# Patient Record
Sex: MALE | Race: WHITE | NOT HISPANIC OR LATINO | Employment: OTHER | URBAN - METROPOLITAN AREA
[De-identification: names, ages, dates, MRNs, and addresses within clinical notes are randomized per-mention and may not be internally consistent; named-entity substitution may affect disease eponyms.]

---

## 2017-01-19 DIAGNOSIS — E78.5 HYPERLIPIDEMIA: ICD-10-CM

## 2017-01-19 RX ORDER — ATORVASTATIN CALCIUM 80 MG/1
TABLET, FILM COATED ORAL
Qty: 30 TABLET | Refills: 0 | Status: SHIPPED | OUTPATIENT
Start: 2017-01-19 | End: 2017-03-04 | Stop reason: SDUPTHER

## 2017-02-13 DIAGNOSIS — G47.00 INSOMNIA, UNSPECIFIED TYPE: ICD-10-CM

## 2017-02-14 RX ORDER — ALPRAZOLAM 0.25 MG/1
0.25 TABLET ORAL NIGHTLY PRN
Qty: 30 TABLET | Refills: 0 | Status: SHIPPED | OUTPATIENT
Start: 2017-02-14 | End: 2017-07-17 | Stop reason: SDUPTHER

## 2017-03-04 DIAGNOSIS — E78.5 HYPERLIPIDEMIA: ICD-10-CM

## 2017-03-06 RX ORDER — ATORVASTATIN CALCIUM 80 MG/1
TABLET, FILM COATED ORAL
Qty: 30 TABLET | Refills: 3 | Status: SHIPPED | OUTPATIENT
Start: 2017-03-06 | End: 2017-07-23 | Stop reason: SDUPTHER

## 2017-07-12 ENCOUNTER — OFFICE VISIT (OUTPATIENT)
Dept: INTERNAL MEDICINE | Facility: CLINIC | Age: 62
End: 2017-07-12
Payer: MEDICARE

## 2017-07-12 DIAGNOSIS — I10 ESSENTIAL HYPERTENSION: Primary | ICD-10-CM

## 2017-07-17 ENCOUNTER — OFFICE VISIT (OUTPATIENT)
Dept: INTERNAL MEDICINE | Facility: CLINIC | Age: 62
End: 2017-07-17
Payer: MEDICARE

## 2017-07-17 VITALS
OXYGEN SATURATION: 96 % | RESPIRATION RATE: 16 BRPM | DIASTOLIC BLOOD PRESSURE: 62 MMHG | BODY MASS INDEX: 29.99 KG/M2 | SYSTOLIC BLOOD PRESSURE: 112 MMHG | HEART RATE: 81 BPM | HEIGHT: 76 IN | WEIGHT: 246.25 LBS

## 2017-07-17 DIAGNOSIS — F41.1 GENERALIZED ANXIETY DISORDER: Primary | ICD-10-CM

## 2017-07-17 DIAGNOSIS — F41.0 PANIC DISORDER: ICD-10-CM

## 2017-07-17 DIAGNOSIS — G47.00 INSOMNIA, UNSPECIFIED TYPE: ICD-10-CM

## 2017-07-17 PROCEDURE — 99213 OFFICE O/P EST LOW 20 MIN: CPT | Mod: PBBFAC | Performed by: INTERNAL MEDICINE

## 2017-07-17 PROCEDURE — 99214 OFFICE O/P EST MOD 30 MIN: CPT | Mod: S$PBB | Performed by: INTERNAL MEDICINE

## 2017-07-17 PROCEDURE — 99999 PR PBB SHADOW E&M-EST. PATIENT-LVL III: CPT | Mod: PBBFAC,,, | Performed by: INTERNAL MEDICINE

## 2017-07-17 RX ORDER — METOPROLOL SUCCINATE 50 MG/1
50 TABLET, EXTENDED RELEASE ORAL DAILY
Status: ON HOLD | COMMUNITY
Start: 2017-05-29 | End: 2018-01-28

## 2017-07-17 RX ORDER — ALPRAZOLAM 0.25 MG/1
0.25 TABLET ORAL NIGHTLY PRN
Qty: 30 TABLET | Refills: 0 | Status: SHIPPED | OUTPATIENT
Start: 2017-07-17 | End: 2017-08-17 | Stop reason: SDUPTHER

## 2017-07-17 RX ORDER — AMLODIPINE BESYLATE 10 MG/1
10 TABLET ORAL DAILY
Status: ON HOLD | COMMUNITY
Start: 2017-06-25 | End: 2021-03-23 | Stop reason: SDUPTHER

## 2017-07-17 RX ORDER — SERTRALINE HYDROCHLORIDE 50 MG/1
50 TABLET, FILM COATED ORAL DAILY
Qty: 30 TABLET | Refills: 3 | Status: ON HOLD | OUTPATIENT
Start: 2017-07-17 | End: 2018-01-26

## 2017-07-17 NOTE — PROGRESS NOTES
Subjective:       Patient ID: Juan Young is a 61 y.o. male.    Chief Complaint: Dizziness    Juan Young is a 61 y.o. male  Here for multiple symptoms;  C/o dizziness for 4 weeks; very nervous ;excessive worry .  Sad at times. Crying ;looks depressed .  Fluttering in chest ;sees  Dr Fail .  Not sleeping good.        Dizziness:    Associated symptoms: headaches and light-headedness.no fever, no tinnitus, no vomiting and no chest pain.    Review of Systems   Constitutional: Negative for chills and fever.   HENT: Negative for congestion, postnasal drip and tinnitus.    Respiratory: Negative for chest tightness and shortness of breath.    Cardiovascular: Negative for chest pain.   Gastrointestinal: Negative for abdominal distention, blood in stool and vomiting.   Genitourinary: Negative for dysuria, flank pain and hematuria.   Musculoskeletal: Negative for myalgias.   Skin: Negative for pallor.   Neurological: Positive for dizziness, light-headedness and headaches. Negative for facial asymmetry and speech difficulty.   Hematological: Does not bruise/bleed easily.   Psychiatric/Behavioral: Positive for decreased concentration. Negative for agitation and suicidal ideas. The patient is nervous/anxious.        Objective:      Physical Exam   Constitutional: He is oriented to person, place, and time. He appears well-developed and well-nourished.   HENT:   Head: Normocephalic and atraumatic.   Neck: No JVD present.   Cardiovascular: Normal rate, regular rhythm and normal heart sounds.    Pulmonary/Chest: Effort normal and breath sounds normal.   Abdominal: Soft. Bowel sounds are normal. There is no tenderness.   Musculoskeletal: He exhibits no edema.   Neurological: He is alert and oriented to person, place, and time.   Skin: Skin is warm and dry.   Psychiatric: He has a normal mood and affect. His behavior is normal. Judgment and thought content normal.   Nursing note and vitals reviewed.      Assessment:       1.  Generalized anxiety disorder    2. Panic disorder    3. Insomnia, unspecified type        Plan:   Juan was seen today for dizziness.    Diagnoses and all orders for this visit:    Generalized anxiety disorder  -     sertraline (ZOLOFT) 50 MG tablet; Take 1 tablet (50 mg total) by mouth once daily.    Panic disorder  -     sertraline (ZOLOFT) 50 MG tablet; Take 1 tablet (50 mg total) by mouth once daily.    Insomnia, unspecified type  -     alprazolam (XANAX) 0.25 MG tablet; Take 1 tablet (0.25 mg total) by mouth nightly as needed for Anxiety.    his dizziness ; anxious feeling all could be   Excessive anxiety and panic attacks.      RTC 4 weeks.

## 2017-07-23 DIAGNOSIS — E78.5 HYPERLIPIDEMIA: ICD-10-CM

## 2017-07-24 RX ORDER — ATORVASTATIN CALCIUM 80 MG/1
TABLET, FILM COATED ORAL
Qty: 30 TABLET | Refills: 5 | Status: SHIPPED | OUTPATIENT
Start: 2017-07-24 | End: 2017-08-17

## 2017-08-17 ENCOUNTER — OFFICE VISIT (OUTPATIENT)
Dept: INTERNAL MEDICINE | Facility: CLINIC | Age: 62
End: 2017-08-17
Payer: MEDICARE

## 2017-08-17 VITALS
DIASTOLIC BLOOD PRESSURE: 74 MMHG | HEART RATE: 64 BPM | WEIGHT: 250 LBS | HEIGHT: 76 IN | BODY MASS INDEX: 30.44 KG/M2 | RESPIRATION RATE: 16 BRPM | SYSTOLIC BLOOD PRESSURE: 120 MMHG

## 2017-08-17 DIAGNOSIS — F41.1 GAD (GENERALIZED ANXIETY DISORDER): Primary | ICD-10-CM

## 2017-08-17 DIAGNOSIS — G47.00 INSOMNIA, UNSPECIFIED TYPE: ICD-10-CM

## 2017-08-17 PROCEDURE — 99213 OFFICE O/P EST LOW 20 MIN: CPT | Mod: S$PBB | Performed by: INTERNAL MEDICINE

## 2017-08-17 PROCEDURE — 99999 PR STA SHADOW: CPT | Mod: PBBFAC,,, | Performed by: INTERNAL MEDICINE

## 2017-08-17 PROCEDURE — 99999 PR PBB SHADOW E&M-EST. PATIENT-LVL III: CPT | Mod: PBBFAC,,, | Performed by: INTERNAL MEDICINE

## 2017-08-17 PROCEDURE — 99213 OFFICE O/P EST LOW 20 MIN: CPT | Mod: PBBFAC | Performed by: INTERNAL MEDICINE

## 2017-08-17 RX ORDER — ALPRAZOLAM 0.25 MG/1
0.25 TABLET ORAL NIGHTLY PRN
Qty: 30 TABLET | Refills: 0 | Status: SHIPPED | OUTPATIENT
Start: 2017-08-17 | End: 2017-11-08 | Stop reason: SDUPTHER

## 2017-08-17 RX ORDER — ATORVASTATIN CALCIUM 40 MG/1
40 TABLET, FILM COATED ORAL DAILY
Status: ON HOLD | COMMUNITY
End: 2018-01-28

## 2017-08-17 NOTE — PROGRESS NOTES
Subjective:       Patient ID: Juan Young is a 61 y.o. male.    Chief Complaint: Follow-up (4 wk; anxiety )    Juan Young is a 61 y.o. male  Here for follow up for anxiety and insomnia.  I started him on zoloft and xanax .  He stopped zoloft  Because he felt too drowsy.  He reports taking alprazolam as needed .  We discussed that he should take zoloft again ;he is willing to try that.  He reports anxiety and depressive symptoms are better; some dizzy spells at times.  He has been taking 20 mg of lisinopril and also 1/2 lipitor too        Review of Systems   Constitutional: Negative for chills and fever.   HENT: Negative for congestion, postnasal drip and tinnitus.    Respiratory: Negative for chest tightness and shortness of breath.    Cardiovascular: Negative for chest pain.   Gastrointestinal: Negative for abdominal distention, blood in stool and vomiting.   Genitourinary: Negative for dysuria, flank pain and hematuria.   Musculoskeletal: Negative for myalgias.   Skin: Negative for pallor.   Neurological: Positive for dizziness, light-headedness and headaches. Negative for facial asymmetry and speech difficulty.   Hematological: Does not bruise/bleed easily.   Psychiatric/Behavioral: Positive for decreased concentration. Negative for agitation and suicidal ideas. The patient is nervous/anxious.        Objective:      Physical Exam   Constitutional: He is oriented to person, place, and time. He appears well-developed and well-nourished.   HENT:   Head: Normocephalic and atraumatic.   Neck: No JVD present.   Cardiovascular: Normal rate, regular rhythm and normal heart sounds.    Pulmonary/Chest: Effort normal and breath sounds normal.   Abdominal: Soft. Bowel sounds are normal. There is no tenderness.   Musculoskeletal: He exhibits no edema.   Neurological: He is alert and oriented to person, place, and time.   Skin: Skin is warm and dry.   Psychiatric: He has a normal mood and affect. His behavior is normal.  Judgment and thought content normal.   Nursing note and vitals reviewed.      Assessment:       1. ANDREW (generalized anxiety disorder)    2. Insomnia, unspecified type        Plan:   Juan was seen today for follow-up.    Diagnoses and all orders for this visit:    ANDREW (generalized anxiety disorder)  Resume zoloft   Prn alprazolam  Avoid driving ; watch for over sedation     Insomnia, unspecified type  -     alprazolam (XANAX) 0.25 MG tablet; Take 1 tablet (0.25 mg total) by mouth nightly as needed for Anxiety.    I told him to do labs in 3-6 months; he will do them with cardiologist .

## 2017-09-15 DIAGNOSIS — Z12.11 COLON CANCER SCREENING: ICD-10-CM

## 2017-11-08 ENCOUNTER — OFFICE VISIT (OUTPATIENT)
Dept: INTERNAL MEDICINE | Facility: CLINIC | Age: 62
End: 2017-11-08
Payer: MEDICARE

## 2017-11-08 VITALS
HEIGHT: 76 IN | RESPIRATION RATE: 14 BRPM | HEART RATE: 65 BPM | SYSTOLIC BLOOD PRESSURE: 110 MMHG | BODY MASS INDEX: 29.88 KG/M2 | OXYGEN SATURATION: 96 % | WEIGHT: 245.38 LBS | DIASTOLIC BLOOD PRESSURE: 80 MMHG

## 2017-11-08 DIAGNOSIS — F41.1 GAD (GENERALIZED ANXIETY DISORDER): Primary | ICD-10-CM

## 2017-11-08 DIAGNOSIS — I24.9 ACS (ACUTE CORONARY SYNDROME): ICD-10-CM

## 2017-11-08 DIAGNOSIS — Z12.5 SCREENING FOR PROSTATE CANCER: ICD-10-CM

## 2017-11-08 DIAGNOSIS — I10 ESSENTIAL HYPERTENSION: ICD-10-CM

## 2017-11-08 DIAGNOSIS — E78.5 HYPERLIPIDEMIA, UNSPECIFIED HYPERLIPIDEMIA TYPE: Chronic | ICD-10-CM

## 2017-11-08 DIAGNOSIS — G47.00 INSOMNIA, UNSPECIFIED TYPE: ICD-10-CM

## 2017-11-08 PROCEDURE — 99999 PR STA SHADOW: CPT | Mod: PBBFAC,,, | Performed by: INTERNAL MEDICINE

## 2017-11-08 PROCEDURE — 90688 IIV4 VACCINE SPLT 0.5 ML IM: CPT | Mod: PBBFAC

## 2017-11-08 PROCEDURE — 99999 FLU VACCINE (QUAD) DO NOT ORDER: CPT | Mod: PBBFAC,,,

## 2017-11-08 PROCEDURE — 99999 PR PBB SHADOW E&M-EST. PATIENT-LVL III: CPT | Mod: PBBFAC,,, | Performed by: INTERNAL MEDICINE

## 2017-11-08 PROCEDURE — 99213 OFFICE O/P EST LOW 20 MIN: CPT | Mod: S$PBB | Performed by: INTERNAL MEDICINE

## 2017-11-08 PROCEDURE — 99213 OFFICE O/P EST LOW 20 MIN: CPT | Mod: PBBFAC | Performed by: INTERNAL MEDICINE

## 2017-11-08 RX ORDER — ALPRAZOLAM 0.25 MG/1
0.25 TABLET ORAL NIGHTLY PRN
Qty: 30 TABLET | Refills: 0 | Status: SHIPPED | OUTPATIENT
Start: 2017-11-08 | End: 2018-02-14 | Stop reason: SDUPTHER

## 2017-11-08 NOTE — PROGRESS NOTES
Subjective:       Patient ID: Juan Young is a 62 y.o. male.    Chief Complaint: Anxiety (3 month follow up) and Insomnia    Juan Young is a 61 y.o. male  Here for follow up for anxiety and insomnia.  I started him on zoloft and xanax .  He stopped zoloft  Because he felt too drowsy.  He reports taking alprazolam as needed .  We discussed that he should take zoloft again ;he is willing to try that.  He reports anxiety and depressive symptoms are better; some dizzy spells at times.  He has been taking 20 mg of lisinopril and also 1/2 lipitor too        Anxiety   Symptoms include decreased concentration, dizziness and nervous/anxious behavior. Patient reports no chest pain, shortness of breath or suicidal ideas.         Review of Systems   Constitutional: Negative for chills and fever.   HENT: Negative for congestion, postnasal drip and tinnitus.    Respiratory: Negative for chest tightness and shortness of breath.    Cardiovascular: Negative for chest pain.   Gastrointestinal: Negative for abdominal distention, blood in stool and vomiting.   Genitourinary: Negative for dysuria, flank pain and hematuria.   Musculoskeletal: Negative for myalgias.   Skin: Negative for pallor.   Neurological: Positive for dizziness, light-headedness and headaches. Negative for facial asymmetry and speech difficulty.   Hematological: Does not bruise/bleed easily.   Psychiatric/Behavioral: Positive for decreased concentration. Negative for agitation and suicidal ideas. The patient is nervous/anxious.        Objective:      Physical Exam   Constitutional: He is oriented to person, place, and time. He appears well-developed and well-nourished.   HENT:   Head: Normocephalic and atraumatic.   Neck: No JVD present.   Cardiovascular: Normal rate, regular rhythm and normal heart sounds.    Pulmonary/Chest: Effort normal and breath sounds normal.   Abdominal: Soft. Bowel sounds are normal. There is no tenderness.   Musculoskeletal: He  exhibits no edema.   Neurological: He is alert and oriented to person, place, and time.   Skin: Skin is warm and dry.   Psychiatric: He has a normal mood and affect. His behavior is normal. Judgment and thought content normal.   Nursing note and vitals reviewed.      Assessment:       1. ANDREW (generalized anxiety disorder)    2. Insomnia, unspecified type    3. Essential hypertension    4. Hyperlipidemia, unspecified hyperlipidemia type    5. ACS (acute coronary syndrome)    6. Screening for prostate cancer        Plan:   Juan was seen today for anxiety and insomnia.    Diagnoses and all orders for this visit:    ANDREW (generalized anxiety disorder)  -     ALPRAZolam (XANAX) 0.25 MG tablet; Take 1 tablet (0.25 mg total) by mouth nightly as needed for Anxiety.    Insomnia, unspecified type  -     ALPRAZolam (XANAX) 0.25 MG tablet; Take 1 tablet (0.25 mg total) by mouth nightly as needed for Anxiety.    Essential hypertension  -     CBC auto differential; Future  -     Comprehensive metabolic panel; Future    Hyperlipidemia, unspecified hyperlipidemia type  -     Lipid panel; Future  -     TSH; Future    ACS (acute coronary syndrome)  -     Lipid panel; Future    Screening for prostate cancer  -     PSA, Screening; Future

## 2017-12-21 ENCOUNTER — TELEPHONE (OUTPATIENT)
Dept: INTERNAL MEDICINE | Facility: CLINIC | Age: 62
End: 2017-12-21

## 2017-12-21 RX ORDER — BENZONATATE 200 MG/1
200 CAPSULE ORAL 3 TIMES DAILY PRN
Qty: 30 CAPSULE | Refills: 0 | Status: SHIPPED | OUTPATIENT
Start: 2017-12-21 | End: 2017-12-31

## 2017-12-21 RX ORDER — OSELTAMIVIR PHOSPHATE 75 MG/1
75 CAPSULE ORAL DAILY
Qty: 10 CAPSULE | Refills: 0 | Status: SHIPPED | OUTPATIENT
Start: 2017-12-21 | End: 2017-12-31

## 2017-12-21 NOTE — TELEPHONE ENCOUNTER
pts grandson has diagnosed flu. He was coughing in pts face 2 nights ago, and pt started with severe headache and coughing last night. Can we send him in some Tamiflu and something for cough to [facility]?

## 2017-12-21 NOTE — TELEPHONE ENCOUNTER
----- Message from Joanne Venegas sent at 2017  9:29 AM CST -----  Contact: SELF  Juan Young  MRN: [id number]  : 1955  PCP: Soren Pineda  Home Phone      [phone]  Work Phone      Not on file.  Mobile          [phone]      MESSAGE: WANTS TO BE SEEN TODAY FOR POSSIBLE FLU. PLEASE CALL.     PHONE: [phone]

## 2018-01-26 PROBLEM — Z98.61 CAD S/P PERCUTANEOUS CORONARY ANGIOPLASTY: Chronic | Status: ACTIVE | Noted: 2018-01-26

## 2018-01-26 PROBLEM — R07.9 CHEST PAIN: Status: ACTIVE | Noted: 2018-01-26

## 2018-01-26 PROBLEM — I25.10 CAD S/P PERCUTANEOUS CORONARY ANGIOPLASTY: Chronic | Status: ACTIVE | Noted: 2018-01-26

## 2018-01-26 PROBLEM — I20.9 ANGINA, CLASS III: Status: ACTIVE | Noted: 2018-01-26

## 2018-01-30 ENCOUNTER — TELEPHONE (OUTPATIENT)
Dept: INTERNAL MEDICINE | Facility: CLINIC | Age: 63
End: 2018-01-30

## 2018-01-30 NOTE — TELEPHONE ENCOUNTER
----- Message from Joanne Venegas sent at 2018  2:07 PM CST -----  Contact: SELF   Juan Young  MRN: [id number]  : 1955  PCP: Soren Pineda  Home Phone      [phone]  Work Phone      Not on file.  Mobile          [phone]      MESSAGE: PT WANTS TO KNOW IF HE STILL HAS TO HAVE ROUTINE BW DONE NEXT WEEK. HE SAID HE JUST HAD A HEART ATTACK. PLEASE CALL    PHONE: [phone]

## 2018-02-14 ENCOUNTER — OFFICE VISIT (OUTPATIENT)
Dept: INTERNAL MEDICINE | Facility: CLINIC | Age: 63
End: 2018-02-14
Payer: MEDICARE

## 2018-02-14 VITALS
HEIGHT: 76 IN | OXYGEN SATURATION: 97 % | SYSTOLIC BLOOD PRESSURE: 120 MMHG | RESPIRATION RATE: 16 BRPM | WEIGHT: 237.88 LBS | HEART RATE: 98 BPM | BODY MASS INDEX: 28.97 KG/M2 | DIASTOLIC BLOOD PRESSURE: 72 MMHG | TEMPERATURE: 99 F

## 2018-02-14 DIAGNOSIS — J06.9 VIRAL URI WITH COUGH: Primary | ICD-10-CM

## 2018-02-14 DIAGNOSIS — F41.1 GAD (GENERALIZED ANXIETY DISORDER): ICD-10-CM

## 2018-02-14 DIAGNOSIS — G47.00 INSOMNIA, UNSPECIFIED TYPE: ICD-10-CM

## 2018-02-14 PROCEDURE — 99213 OFFICE O/P EST LOW 20 MIN: CPT | Mod: PBBFAC,25 | Performed by: INTERNAL MEDICINE

## 2018-02-14 PROCEDURE — 96372 THER/PROPH/DIAG INJ SC/IM: CPT | Mod: PBBFAC

## 2018-02-14 PROCEDURE — 99999 PR PBB SHADOW E&M-EST. PATIENT-LVL III: CPT | Mod: PBBFAC,,, | Performed by: INTERNAL MEDICINE

## 2018-02-14 PROCEDURE — 99999 PR STA SHADOW: CPT | Mod: PBBFAC,,, | Performed by: INTERNAL MEDICINE

## 2018-02-14 PROCEDURE — 99999 PR STA SHADOW: CPT | Mod: PBBFAC,,,

## 2018-02-14 PROCEDURE — 1111F DSCHRG MED/CURRENT MED MERGE: CPT | Performed by: INTERNAL MEDICINE

## 2018-02-14 PROCEDURE — 99214 OFFICE O/P EST MOD 30 MIN: CPT | Mod: S$PBB | Performed by: INTERNAL MEDICINE

## 2018-02-14 RX ORDER — CODEINE PHOSPHATE AND GUAIFENESIN 10; 100 MG/5ML; MG/5ML
5 SOLUTION ORAL EVERY 6 HOURS PRN
Qty: 120 ML | Refills: 0 | Status: SHIPPED | OUTPATIENT
Start: 2018-02-14 | End: 2018-04-14

## 2018-02-14 RX ORDER — ALPRAZOLAM 0.25 MG/1
0.25 TABLET ORAL NIGHTLY PRN
Qty: 30 TABLET | Refills: 0 | Status: SHIPPED | OUTPATIENT
Start: 2018-02-14 | End: 2018-08-14 | Stop reason: SDUPTHER

## 2018-02-14 RX ORDER — METHYLPREDNISOLONE ACETATE 80 MG/ML
80 INJECTION, SUSPENSION INTRA-ARTICULAR; INTRALESIONAL; INTRAMUSCULAR; SOFT TISSUE
Status: COMPLETED | OUTPATIENT
Start: 2018-02-14 | End: 2018-02-14

## 2018-02-14 RX ADMIN — METHYLPREDNISOLONE ACETATE 80 MG: 80 INJECTION, SUSPENSION INTRA-ARTICULAR; INTRALESIONAL; INTRAMUSCULAR; SOFT TISSUE at 10:02

## 2018-02-14 NOTE — PROGRESS NOTES
Subjective:       Patient ID: Juan Young is a 62 y.o. male.    Chief Complaint: Anxiety (3 month follow up); Insomnia; Cough; and Sore Throat    Juan Young is a 62 y.o. male  Here for anxiety and URI       Anxiety   Presents for follow-up visit. Symptoms include excessive worry, muscle tension, nervous/anxious behavior and palpitations. Patient reports no chest pain, dizziness, nausea or shortness of breath. The quality of sleep is poor.       Cough   This is a new problem. The current episode started in the past 7 days. The problem has been gradually worsening. The problem occurs hourly. The cough is non-productive. Associated symptoms include postnasal drip and a sore throat. Pertinent negatives include no chest pain, headaches, rash or shortness of breath. Treatments tried: halls. The treatment provided no relief.   Sore Throat    Associated symptoms include congestion and coughing. Pertinent negatives include no abdominal pain, diarrhea, headaches, shortness of breath or vomiting.     Review of Systems   Constitutional: Positive for fatigue.   HENT: Positive for congestion, postnasal drip, sinus pressure and sore throat.    Eyes: Negative.    Respiratory: Positive for cough. Negative for chest tightness and shortness of breath.    Cardiovascular: Positive for palpitations. Negative for chest pain and leg swelling.   Gastrointestinal: Negative for abdominal distention, abdominal pain, constipation, diarrhea, nausea and vomiting.   Genitourinary: Negative for dysuria, flank pain, frequency and urgency.   Musculoskeletal: Negative for back pain and joint swelling.   Skin: Negative for rash and wound.   Neurological: Negative for dizziness, seizures, speech difficulty, weakness, light-headedness and headaches.   Psychiatric/Behavioral: The patient is nervous/anxious.        Objective:      Physical Exam   Constitutional: He is oriented to person, place, and time. He appears well-developed and well-nourished.    HENT:   Head: Normocephalic and atraumatic.   Right Ear: Tympanic membrane is retracted.   Left Ear: Tympanic membrane is retracted.   Mouth/Throat: No oropharyngeal exudate.   Nasal congestion  Mild pharyngeal erythema; no exudate  Bilateral TM retracted with fluid noted   Eyes: Pupils are equal, round, and reactive to light.   Neck: Normal range of motion. Neck supple.   Cardiovascular: Normal rate, regular rhythm, normal heart sounds and intact distal pulses.    Pulmonary/Chest: Effort normal and breath sounds normal.   Abdominal: Soft. Bowel sounds are normal. He exhibits no mass. There is no tenderness.   Musculoskeletal: Normal range of motion. He exhibits no edema.   Neurological: He is alert and oriented to person, place, and time. He has normal reflexes.   Skin: Skin is warm and dry.   Psychiatric: He has a normal mood and affect. His behavior is normal. Judgment and thought content normal.       Assessment:       1. Viral URI with cough    2. Insomnia, unspecified type    3. ANDREW (generalized anxiety disorder)        Plan:   Juan was seen today for anxiety, insomnia, cough and sore throat.    Diagnoses and all orders for this visit:    Viral URI with cough  -     guaifenesin-codeine 100-10 mg/5 ml (CHERATUSSIN AC)  mg/5 mL syrup; Take 5 mLs by mouth every 6 (six) hours as needed for Cough.  -     methylPREDNISolone acetate injection 80 mg; Inject 1 mL (80 mg total) into the muscle one time.    Insomnia, unspecified type  -     ALPRAZolam (XANAX) 0.25 MG tablet; Take 1 tablet (0.25 mg total) by mouth nightly as needed for Anxiety.    ANDREW (generalized anxiety disorder)  -     ALPRAZolam (XANAX) 0.25 MG tablet; Take 1 tablet (0.25 mg total) by mouth nightly as needed for Anxiety.      Take Claritin/allegra/flonase  Simply saline nasal lavage q.2 to 3 hours p.r.n.  Avoid dust, allergens, other sinus irritants.  Handwashing technique discussed to prevent spreading germs.

## 2018-02-16 ENCOUNTER — TELEPHONE (OUTPATIENT)
Dept: INTERNAL MEDICINE | Facility: CLINIC | Age: 63
End: 2018-02-16

## 2018-02-16 RX ORDER — DOXYCYCLINE 100 MG/1
100 CAPSULE ORAL 2 TIMES DAILY
Qty: 20 CAPSULE | Refills: 0 | Status: SHIPPED | OUTPATIENT
Start: 2018-02-16 | End: 2018-03-08

## 2018-02-16 NOTE — TELEPHONE ENCOUNTER
Patient was seen on 2/14/2018 for a URI and called c/o itchy throat and cough. He's asking if an antibiotic could be sent in to his local pharmacy.

## 2018-02-16 NOTE — TELEPHONE ENCOUNTER
----- Message from Marisol Sandy sent at 2018  9:34 AM CST -----  Contact: pt  Juan Young  MRN: [id number]  : 1955  PCP: Soren Pineda  Home Phone      [phone]  Work Phone      Not on file.  Mobile          [phone]      MESSAGE:  Pt still having an itchy throat and wants to know if we can call in some antibiotics for him. Says the cough medication isn't working and he was up all last night with a scratchy throat. Please advise.  PHONE:  [phone]

## 2018-05-14 RX ORDER — ATORVASTATIN CALCIUM 80 MG/1
80 TABLET, FILM COATED ORAL DAILY
COMMUNITY
Start: 2018-05-11 | End: 2018-05-14 | Stop reason: SDUPTHER

## 2018-05-15 RX ORDER — ATORVASTATIN CALCIUM 80 MG/1
80 TABLET, FILM COATED ORAL DAILY
Qty: 90 TABLET | Refills: 1 | Status: SHIPPED | OUTPATIENT
Start: 2018-05-15 | End: 2019-03-11 | Stop reason: SDUPTHER

## 2018-08-14 ENCOUNTER — OFFICE VISIT (OUTPATIENT)
Dept: INTERNAL MEDICINE | Facility: CLINIC | Age: 63
End: 2018-08-14
Payer: MEDICARE

## 2018-08-14 VITALS
DIASTOLIC BLOOD PRESSURE: 80 MMHG | BODY MASS INDEX: 32.22 KG/M2 | SYSTOLIC BLOOD PRESSURE: 128 MMHG | HEART RATE: 79 BPM | RESPIRATION RATE: 16 BRPM | OXYGEN SATURATION: 97 % | HEIGHT: 76 IN | WEIGHT: 264.56 LBS

## 2018-08-14 DIAGNOSIS — F41.1 GAD (GENERALIZED ANXIETY DISORDER): ICD-10-CM

## 2018-08-14 DIAGNOSIS — J02.9 ACUTE PHARYNGITIS, UNSPECIFIED ETIOLOGY: Primary | ICD-10-CM

## 2018-08-14 DIAGNOSIS — G47.00 INSOMNIA, UNSPECIFIED TYPE: ICD-10-CM

## 2018-08-14 PROBLEM — R07.9 CHEST PAIN: Status: RESOLVED | Noted: 2018-01-26 | Resolved: 2018-08-14

## 2018-08-14 PROCEDURE — 99999 PR STA SHADOW: CPT | Mod: PBBFAC,,, | Performed by: INTERNAL MEDICINE

## 2018-08-14 PROCEDURE — 99999 PR PBB SHADOW E&M-EST. PATIENT-LVL III: CPT | Mod: PBBFAC,,, | Performed by: INTERNAL MEDICINE

## 2018-08-14 PROCEDURE — 99213 OFFICE O/P EST LOW 20 MIN: CPT | Mod: S$PBB | Performed by: INTERNAL MEDICINE

## 2018-08-14 PROCEDURE — 99213 OFFICE O/P EST LOW 20 MIN: CPT | Mod: PBBFAC | Performed by: INTERNAL MEDICINE

## 2018-08-14 RX ORDER — ALPRAZOLAM 0.25 MG/1
0.25 TABLET ORAL NIGHTLY PRN
Qty: 30 TABLET | Refills: 0 | Status: SHIPPED | OUTPATIENT
Start: 2018-08-14 | End: 2018-11-15 | Stop reason: SDUPTHER

## 2018-08-14 RX ORDER — AMOXICILLIN 500 MG/1
500 TABLET, FILM COATED ORAL EVERY 12 HOURS
Qty: 20 TABLET | Refills: 0 | Status: SHIPPED | OUTPATIENT
Start: 2018-08-14 | End: 2018-08-24

## 2018-08-14 NOTE — PROGRESS NOTES
Subjective:       Patient ID: uJan Young is a 62 y.o. male.    Chief Complaint: Sore Throat and Cough    Sore Throat    This is a new problem. The current episode started in the past 7 days. The problem has been gradually worsening. Neither side of throat is experiencing more pain than the other. There has been no fever. Associated symptoms include congestion, coughing and trouble swallowing. Pertinent negatives include no abdominal pain, diarrhea, ear pain, headaches, hoarse voice, plugged ear sensation, shortness of breath or vomiting. He has tried acetaminophen for the symptoms. The treatment provided no relief.   Cough   Associated symptoms include postnasal drip and a sore throat. Pertinent negatives include no chest pain, ear pain, headaches, rash or shortness of breath.     Review of Systems   Constitutional: Positive for fatigue.   HENT: Positive for congestion, postnasal drip, sinus pressure, sore throat and trouble swallowing. Negative for ear pain and hoarse voice.    Eyes: Negative.    Respiratory: Positive for cough. Negative for chest tightness and shortness of breath.    Cardiovascular: Negative for chest pain, palpitations and leg swelling.   Gastrointestinal: Negative for abdominal distention, abdominal pain, constipation, diarrhea, nausea and vomiting.   Genitourinary: Negative for dysuria, flank pain, frequency and urgency.   Musculoskeletal: Negative for back pain and joint swelling.   Skin: Negative for rash and wound.   Neurological: Negative for dizziness, seizures, speech difficulty, weakness, light-headedness and headaches.       Objective:      Physical Exam   Constitutional: He is oriented to person, place, and time. He appears well-developed and well-nourished.   HENT:   Head: Normocephalic and atraumatic.   Right Ear: Tympanic membrane is retracted.   Left Ear: Tympanic membrane is retracted.   Mouth/Throat: Posterior oropharyngeal erythema present. No oropharyngeal exudate.   Nasal  congestion  Mild pharyngeal erythema; no exudate  Bilateral TM retracted with fluid noted   Eyes: Pupils are equal, round, and reactive to light.   Neck: Normal range of motion. Neck supple.   Cardiovascular: Normal rate, regular rhythm, normal heart sounds and intact distal pulses.   Pulmonary/Chest: Effort normal and breath sounds normal.   Abdominal: Soft. Bowel sounds are normal. He exhibits no mass. There is no tenderness.   Musculoskeletal: Normal range of motion. He exhibits no edema.   Neurological: He is alert and oriented to person, place, and time. He has normal reflexes.   Skin: Skin is warm and dry.   Psychiatric: He has a normal mood and affect. His behavior is normal. Judgment and thought content normal.       Assessment:       1. Acute pharyngitis, unspecified etiology    2. ANDREW (generalized anxiety disorder)    3. Insomnia, unspecified type        Plan:   Juan was seen today for sore throat and cough.    Diagnoses and all orders for this visit:    Acute pharyngitis, unspecified etiology  orders  -     amoxicillin (AMOXIL) 500 MG Tab; Take 1 tablet (500 mg total) by mouth every 12 (twelve) hours. for 10 days    ANDREW (generalized anxiety disorder)  -     ALPRAZolam (XANAX) 0.25 MG tablet; Take 1 tablet (0.25 mg total) by mouth nightly as needed for Anxiety.    Insomnia, unspecified type  -     ALPRAZolam (XANAX) 0.25 MG tablet; Take 1 tablet (0.25 mg total) by mouth nightly as needed for Anxiety.        Problem List Items Addressed This Visit     None

## 2018-09-18 ENCOUNTER — PES CALL (OUTPATIENT)
Dept: ADMINISTRATIVE | Facility: CLINIC | Age: 63
End: 2018-09-18

## 2018-10-12 DIAGNOSIS — Z12.11 COLON CANCER SCREENING: ICD-10-CM

## 2018-11-15 ENCOUNTER — OFFICE VISIT (OUTPATIENT)
Dept: INTERNAL MEDICINE | Facility: CLINIC | Age: 63
End: 2018-11-15
Payer: MEDICARE

## 2018-11-15 VITALS
RESPIRATION RATE: 18 BRPM | SYSTOLIC BLOOD PRESSURE: 120 MMHG | WEIGHT: 263 LBS | HEART RATE: 72 BPM | DIASTOLIC BLOOD PRESSURE: 60 MMHG | BODY MASS INDEX: 32.03 KG/M2 | HEIGHT: 76 IN

## 2018-11-15 DIAGNOSIS — R09.82 POST-NASAL DRIP: ICD-10-CM

## 2018-11-15 DIAGNOSIS — F41.1 GAD (GENERALIZED ANXIETY DISORDER): ICD-10-CM

## 2018-11-15 DIAGNOSIS — G47.00 INSOMNIA, UNSPECIFIED TYPE: ICD-10-CM

## 2018-11-15 DIAGNOSIS — J06.9 VIRAL URI WITH COUGH: Primary | ICD-10-CM

## 2018-11-15 DIAGNOSIS — J01.10 ACUTE NON-RECURRENT FRONTAL SINUSITIS: ICD-10-CM

## 2018-11-15 PROCEDURE — 99214 OFFICE O/P EST MOD 30 MIN: CPT | Mod: S$PBB | Performed by: NURSE PRACTITIONER

## 2018-11-15 PROCEDURE — 99999 PR PBB SHADOW E&M-EST. PATIENT-LVL IV: CPT | Mod: PBBFAC,,, | Performed by: NURSE PRACTITIONER

## 2018-11-15 PROCEDURE — 99999 PR STA SHADOW: CPT | Mod: PBBFAC,,, | Performed by: NURSE PRACTITIONER

## 2018-11-15 PROCEDURE — 99214 OFFICE O/P EST MOD 30 MIN: CPT | Mod: PBBFAC | Performed by: NURSE PRACTITIONER

## 2018-11-15 RX ORDER — PREDNISONE 20 MG/1
20 TABLET ORAL 2 TIMES DAILY
Qty: 10 TABLET | Refills: 0 | Status: SHIPPED | OUTPATIENT
Start: 2018-11-15 | End: 2018-11-20

## 2018-11-15 RX ORDER — CLOPIDOGREL BISULFATE 75 MG/1
75 TABLET ORAL DAILY
COMMUNITY
Start: 2018-09-27

## 2018-11-15 RX ORDER — ALPRAZOLAM 0.25 MG/1
0.25 TABLET ORAL NIGHTLY PRN
Qty: 30 TABLET | Refills: 0 | Status: SHIPPED | OUTPATIENT
Start: 2018-11-15 | End: 2019-08-19

## 2018-11-15 RX ORDER — AZITHROMYCIN 250 MG/1
TABLET, FILM COATED ORAL
Qty: 6 TABLET | Refills: 0 | Status: SHIPPED | OUTPATIENT
Start: 2018-11-15 | End: 2018-11-20

## 2018-11-15 RX ORDER — CODEINE PHOSPHATE AND GUAIFENESIN 10; 100 MG/5ML; MG/5ML
5 SOLUTION ORAL 3 TIMES DAILY PRN
Qty: 120 ML | Refills: 0 | Status: SHIPPED | OUTPATIENT
Start: 2018-11-15 | End: 2019-08-19

## 2018-11-15 RX ORDER — CETIRIZINE HYDROCHLORIDE 10 MG/1
10 TABLET ORAL NIGHTLY
Qty: 14 TABLET | Refills: 0 | COMMUNITY
Start: 2018-11-15 | End: 2019-08-19

## 2018-11-15 NOTE — PROGRESS NOTES
Subjective:           Patient ID: Juan Young is a 63 y.o. male.    Chief Complaint: Cough and Headache    64 YO WM new to me, known to fellow providers, dr. Miriam Lugo 4-5 days of cough. Feels headache but did not take temp;   Cough is dry with post nasal drip. Cough is worse at night. NO nasal congestion. No mucous. CC is headache from cough.  Has hx of left lung tumor s/p lung sx.   Has not tried any OTC.   Pt is also requesting refill on xanax; usually filled per Dr. Pineda.       Review of Systems   Constitutional: Negative.    HENT: Positive for postnasal drip. Negative for congestion, ear pain, sinus pressure, sneezing and sore throat.    Eyes: Negative.    Respiratory: Positive for cough. Negative for chest tightness, shortness of breath and wheezing.    Cardiovascular: Negative.  Negative for chest pain.   Gastrointestinal: Negative for abdominal pain, blood in stool, constipation, diarrhea, nausea and vomiting.   Genitourinary: Negative for difficulty urinating, dysuria and flank pain.   Musculoskeletal: Negative.  Negative for joint swelling.   Skin: Negative.    Neurological: Negative for dizziness, weakness and headaches.   Hematological: Negative.    Psychiatric/Behavioral: Negative.  Negative for behavioral problems and confusion.       Objective:      Physical Exam   Constitutional: He is oriented to person, place, and time. He appears well-developed and well-nourished.   HENT:   Head: Normocephalic and atraumatic.   Right Ear: Tympanic membrane is retracted.   Left Ear: Tympanic membrane is retracted.   Mouth/Throat: Posterior oropharyngeal erythema present. No oropharyngeal exudate.   Eyes: Pupils are equal, round, and reactive to light.   Neck: Normal range of motion. Neck supple.   Cardiovascular: Normal rate, regular rhythm, normal heart sounds and intact distal pulses.   Pulmonary/Chest: Effort normal and breath sounds normal.   Left post chest scar- lung removed; no breath sounds  Right  lung clear    Abdominal: Soft. Bowel sounds are normal. He exhibits no mass. There is no tenderness.   Musculoskeletal: Normal range of motion. He exhibits no edema.   Neurological: He is alert and oriented to person, place, and time. He has normal reflexes.   Skin: Skin is warm and dry.   Psychiatric: He has a normal mood and affect. His behavior is normal. Judgment and thought content normal.       Assessment:       1. Viral URI with cough    2. Post-nasal drip    3. Acute non-recurrent frontal sinusitis    4. Insomnia, unspecified type    5. ANDREW (generalized anxiety disorder)        Plan:   Juan was seen today for cough and headache.    Diagnoses and all orders for this visit:    Viral URI with cough  -     cetirizine (ZYRTEC) 10 MG tablet; Take 1 tablet (10 mg total) by mouth every evening.  -     guaifenesin-codeine 100-10 mg/5 ml (TUSSI-ORGANIDIN NR)  mg/5 mL syrup; Take 5 mLs by mouth 3 (three) times daily as needed for Cough.  -     predniSONE (DELTASONE) 20 MG tablet; Take 1 tablet (20 mg total) by mouth 2 (two) times daily. for 5 days    Post-nasal drip    Acute non-recurrent frontal sinusitis  -     azithromycin (Z-KEMI) 250 MG tablet; Take 2 tablets by mouth on day 1; Take 1 tablet by mouth on days 2-5    Insomnia, unspecified type  -     ALPRAZolam (XANAX) 0.25 MG tablet; Take 1 tablet (0.25 mg total) by mouth nightly as needed for Anxiety.    ANDREW (generalized anxiety disorder)  -     ALPRAZolam (XANAX) 0.25 MG tablet; Take 1 tablet (0.25 mg total) by mouth nightly as needed for Anxiety.      Problem List Items Addressed This Visit        Psychiatric    ANDREW (generalized anxiety disorder)    Relevant Medications    ALPRAZolam (XANAX) 0.25 MG tablet      Other Visit Diagnoses     Viral URI with cough    -  Primary    Relevant Medications    cetirizine (ZYRTEC) 10 MG tablet    guaifenesin-codeine 100-10 mg/5 ml (TUSSI-ORGANIDIN NR)  mg/5 mL syrup    predniSONE (DELTASONE) 20 MG tablet     Post-nasal drip        Acute non-recurrent frontal sinusitis        Relevant Medications    azithromycin (Z-KEMI) 250 MG tablet    Insomnia, unspecified type        Relevant Medications    ALPRAZolam (XANAX) 0.25 MG tablet      + hx of left lung removal-   Discussed Not to take zithromax unless not better in 3 days; green mucous or fever

## 2018-11-15 NOTE — PATIENT INSTRUCTIONS
Supportive care/Symptomatic therapy suggested: rest, increase fluids, gargle prn for sore throat, apply heat to sinuses prn, warm showers, use mist of vaporizer prn, Alternate OTC acetaminophen, ibuprofen, if no allergies.   Antihistamine and cough suppressant of choice,   avoid caffeine    Call or return to clinic prn if these symptoms worsen or fail to improve as anticipated within 3 days

## 2018-12-10 ENCOUNTER — HOSPITAL ENCOUNTER (OUTPATIENT)
Dept: SLEEP MEDICINE | Facility: HOSPITAL | Age: 63
Discharge: HOME | End: 2018-12-10
Attending: INTERNAL MEDICINE
Payer: MEDICARE

## 2018-12-10 DIAGNOSIS — G47.33 OBSTRUCTIVE SLEEP APNEA (ADULT) (PEDIATRIC): ICD-10-CM

## 2018-12-10 PROCEDURE — 95810 POLYSOM 6/> YRS 4/> PARAM: CPT

## 2018-12-16 ENCOUNTER — HOSPITAL ENCOUNTER (OUTPATIENT)
Dept: SLEEP MEDICINE | Facility: HOSPITAL | Age: 63
Discharge: HOME | End: 2018-12-16
Attending: INTERNAL MEDICINE
Payer: MEDICARE

## 2018-12-16 DIAGNOSIS — G47.33 OBSTRUCTIVE SLEEP APNEA (ADULT) (PEDIATRIC): ICD-10-CM

## 2018-12-16 PROCEDURE — 95811 POLYSOM 6/>YRS CPAP 4/> PARM: CPT

## 2019-03-12 RX ORDER — ATORVASTATIN CALCIUM 80 MG/1
TABLET, FILM COATED ORAL
Qty: 90 TABLET | Refills: 1 | Status: SHIPPED | OUTPATIENT
Start: 2019-03-12 | End: 2019-10-23 | Stop reason: SDUPTHER

## 2019-08-19 ENCOUNTER — OFFICE VISIT (OUTPATIENT)
Dept: INTERNAL MEDICINE | Facility: CLINIC | Age: 64
End: 2019-08-19
Payer: MEDICARE

## 2019-08-19 VITALS
BODY MASS INDEX: 31.27 KG/M2 | SYSTOLIC BLOOD PRESSURE: 134 MMHG | HEART RATE: 73 BPM | DIASTOLIC BLOOD PRESSURE: 76 MMHG | RESPIRATION RATE: 18 BRPM | WEIGHT: 256.81 LBS | HEIGHT: 76 IN

## 2019-08-19 DIAGNOSIS — L98.9 SKIN LESION: Primary | ICD-10-CM

## 2019-08-19 PROCEDURE — 99999 PR PBB SHADOW E&M-EST. PATIENT-LVL III: ICD-10-PCS | Mod: PBBFAC,,, | Performed by: INTERNAL MEDICINE

## 2019-08-19 PROCEDURE — 99213 OFFICE O/P EST LOW 20 MIN: CPT | Mod: PBBFAC | Performed by: INTERNAL MEDICINE

## 2019-08-19 PROCEDURE — 99999 PR STA SHADOW: CPT | Mod: PBBFAC,,, | Performed by: INTERNAL MEDICINE

## 2019-08-19 PROCEDURE — 99999 PR PBB SHADOW E&M-EST. PATIENT-LVL III: CPT | Mod: PBBFAC,,, | Performed by: INTERNAL MEDICINE

## 2019-08-19 PROCEDURE — 99213 OFFICE O/P EST LOW 20 MIN: CPT | Mod: S$PBB | Performed by: INTERNAL MEDICINE

## 2019-08-19 RX ORDER — CEPHALEXIN 500 MG/1
500 CAPSULE ORAL 3 TIMES DAILY
Qty: 30 CAPSULE | Refills: 0 | Status: SHIPPED | OUTPATIENT
Start: 2019-08-19 | End: 2019-08-29

## 2019-08-19 NOTE — PROGRESS NOTES
Subjective:       Patient ID: Juan Young is a 63 y.o. male.    Chief Complaint: bump on arm    Juan Young is a 63 y.o. male  R forearm lesion which is growing for 1-2 weeks.  It keep draining blood .        Review of Systems   Skin: Positive for color change and wound.       Objective:      Physical Exam   Skin:        3/4 inch size pale lesion ;circular ;firm , raised .         Assessment:       1. Skin lesion        Plan:   Juan was seen today for bump on arm.    Diagnoses and all orders for this visit:    Skin lesion  -     Ambulatory referral to Dermatology  -     cephALEXin (KEFLEX) 500 MG capsule; Take 1 capsule (500 mg total) by mouth 3 (three) times daily. for 10 days    he will need excision   Raised edges ? Sq cell ca vs infected lesion  Problem List Items Addressed This Visit     None

## 2019-10-21 ENCOUNTER — OFFICE VISIT (OUTPATIENT)
Dept: INTERNAL MEDICINE | Facility: CLINIC | Age: 64
End: 2019-10-21
Payer: MEDICARE

## 2019-10-21 VITALS
HEIGHT: 76 IN | BODY MASS INDEX: 30.69 KG/M2 | SYSTOLIC BLOOD PRESSURE: 118 MMHG | TEMPERATURE: 99 F | DIASTOLIC BLOOD PRESSURE: 78 MMHG | OXYGEN SATURATION: 96 % | RESPIRATION RATE: 16 BRPM | HEART RATE: 96 BPM | WEIGHT: 252 LBS

## 2019-10-21 DIAGNOSIS — J06.9 VIRAL URI WITH COUGH: Primary | ICD-10-CM

## 2019-10-21 PROCEDURE — 99999 PR PBB SHADOW E&M-EST. PATIENT-LVL III: CPT | Mod: PBBFAC,,, | Performed by: INTERNAL MEDICINE

## 2019-10-21 PROCEDURE — 99999 PR STA SHADOW: ICD-10-PCS | Mod: PBBFAC,,,

## 2019-10-21 PROCEDURE — 99999 PR STA SHADOW: CPT | Mod: PBBFAC,,, | Performed by: INTERNAL MEDICINE

## 2019-10-21 PROCEDURE — 99999 PR PBB SHADOW E&M-EST. PATIENT-LVL III: ICD-10-PCS | Mod: PBBFAC,,, | Performed by: INTERNAL MEDICINE

## 2019-10-21 PROCEDURE — 99213 OFFICE O/P EST LOW 20 MIN: CPT | Mod: S$PBB | Performed by: INTERNAL MEDICINE

## 2019-10-21 PROCEDURE — 96372 THER/PROPH/DIAG INJ SC/IM: CPT | Mod: PBBFAC

## 2019-10-21 PROCEDURE — 99213 OFFICE O/P EST LOW 20 MIN: CPT | Mod: PBBFAC,25 | Performed by: INTERNAL MEDICINE

## 2019-10-21 PROCEDURE — 99999 PR STA SHADOW: CPT | Mod: PBBFAC,,,

## 2019-10-21 RX ORDER — METHYLPREDNISOLONE ACETATE 80 MG/ML
80 INJECTION, SUSPENSION INTRA-ARTICULAR; INTRALESIONAL; INTRAMUSCULAR; SOFT TISSUE
Status: COMPLETED | OUTPATIENT
Start: 2019-10-21 | End: 2019-10-21

## 2019-10-21 RX ADMIN — METHYLPREDNISOLONE ACETATE 80 MG: 80 INJECTION, SUSPENSION INTRA-ARTICULAR; INTRALESIONAL; INTRAMUSCULAR; SOFT TISSUE at 10:10

## 2019-10-21 NOTE — PROGRESS NOTES
Subjective:       Patient ID: Juan Young is a 63 y.o. male.    Chief Complaint: Cough; Nasal Congestion; sinus pressure; and itchy throat      HPI:  Patient is new to me but known to clinic and presents with congestion and cough. Sx started 2 days ago. He tried OTC cold medication with mild relief of sx. + itchy throat. Denies PND. + cough that is not productive. No SOB or wheezing. No fevers. + sinus congestion. No otalgia. No sick contacts.     Past Medical History:   Diagnosis Date    Anticoagulant long-term use     CAD S/P percutaneous coronary angioplasty 1/26/2018    Hyperlipidemia     Hypertension     Myocardial infarction 2013    stents x 1       Family History   Problem Relation Age of Onset    Cancer Mother     Emphysema Mother     Stroke Father     Coronary artery disease Brother         4 brothers and sister with MI in there 50s and 60s    Heart attack Sister        Social History     Socioeconomic History    Marital status: Single     Spouse name: Not on file    Number of children: Not on file    Years of education: Not on file    Highest education level: Not on file   Occupational History    Not on file   Social Needs    Financial resource strain: Not on file    Food insecurity:     Worry: Not on file     Inability: Not on file    Transportation needs:     Medical: Not on file     Non-medical: Not on file   Tobacco Use    Smoking status: Never Smoker    Smokeless tobacco: Never Used   Substance and Sexual Activity    Alcohol use: No    Drug use: No    Sexual activity: Yes   Lifestyle    Physical activity:     Days per week: Not on file     Minutes per session: Not on file    Stress: Not on file   Relationships    Social connections:     Talks on phone: Not on file     Gets together: Not on file     Attends Church service: Not on file     Active member of club or organization: Not on file     Attends meetings of clubs or organizations: Not on file     Relationship status:  Not on file   Other Topics Concern    Not on file   Social History Narrative    Not on file       Review of Systems   Constitutional: Negative for activity change, fatigue, fever and unexpected weight change.   HENT: Positive for congestion, rhinorrhea and sore throat. Negative for ear pain and hearing loss.    Eyes: Negative for pain, redness and visual disturbance.   Respiratory: Positive for cough. Negative for shortness of breath and wheezing.    Cardiovascular: Negative for chest pain, palpitations and leg swelling.   Gastrointestinal: Negative for abdominal pain, constipation, diarrhea, nausea and vomiting.   Genitourinary: Negative for decreased urine volume, dysuria, frequency and urgency.   Musculoskeletal: Negative for back pain, joint swelling and neck pain.   Skin: Negative for color change, rash and wound.   Neurological: Negative for dizziness, tremors, weakness, light-headedness and headaches.         Objective:      Physical Exam   Constitutional: He is oriented to person, place, and time. He appears well-developed and well-nourished. No distress.   HENT:   Head: Normocephalic and atraumatic.   Right Ear: External ear normal.   Left Ear: External ear normal.   Dull TM b/l  Mild posterior oropharyngeal erythema without exudates   Eyes: Pupils are equal, round, and reactive to light. Conjunctivae and EOM are normal.   Neck: Neck supple. No tracheal deviation present.   Cardiovascular: Normal rate and regular rhythm.   No murmur heard.  Pulmonary/Chest: Effort normal and breath sounds normal. No respiratory distress. He has no wheezes. He has no rales.   Abdominal: Soft. Bowel sounds are normal. He exhibits no distension. There is no tenderness.   Neurological: He is alert and oriented to person, place, and time. No cranial nerve deficit.   Skin: Skin is warm and dry.   Psychiatric: He has a normal mood and affect. His behavior is normal.   Vitals reviewed.      Assessment:       1. Viral URI with  cough        Plan:       Juan was seen today for cough, nasal congestion, sinus pressure and itchy throat.    Diagnoses and all orders for this visit:    Viral URI with cough  -     methylPREDNISolone acetate injection 80 mg    claritin daily  Saline nasal spray PRN  flonase daily  Call for worsening sx or fever > 101 F

## 2019-10-23 RX ORDER — ATORVASTATIN CALCIUM 80 MG/1
TABLET, FILM COATED ORAL
Qty: 90 TABLET | Refills: 1 | Status: SHIPPED | OUTPATIENT
Start: 2019-10-23 | End: 2020-08-24

## 2019-12-10 ENCOUNTER — OFFICE VISIT (OUTPATIENT)
Dept: INTERNAL MEDICINE | Facility: CLINIC | Age: 64
End: 2019-12-10
Payer: MEDICARE

## 2019-12-10 ENCOUNTER — TELEPHONE (OUTPATIENT)
Dept: INTERNAL MEDICINE | Facility: CLINIC | Age: 64
End: 2019-12-10

## 2019-12-10 VITALS
BODY MASS INDEX: 30.69 KG/M2 | HEART RATE: 77 BPM | HEIGHT: 76 IN | DIASTOLIC BLOOD PRESSURE: 76 MMHG | RESPIRATION RATE: 16 BRPM | SYSTOLIC BLOOD PRESSURE: 132 MMHG | OXYGEN SATURATION: 96 % | WEIGHT: 252 LBS

## 2019-12-10 DIAGNOSIS — R25.2 MUSCLE CRAMPS: ICD-10-CM

## 2019-12-10 DIAGNOSIS — Z12.11 COLON CANCER SCREENING: Primary | ICD-10-CM

## 2019-12-10 PROCEDURE — 99213 OFFICE O/P EST LOW 20 MIN: CPT | Mod: PBBFAC | Performed by: NURSE PRACTITIONER

## 2019-12-10 PROCEDURE — 99214 OFFICE O/P EST MOD 30 MIN: CPT | Mod: S$PBB | Performed by: NURSE PRACTITIONER

## 2019-12-10 PROCEDURE — 99999 PR STA SHADOW: ICD-10-PCS | Mod: PBBFAC,,, | Performed by: NURSE PRACTITIONER

## 2019-12-10 PROCEDURE — 99999 PR STA SHADOW: CPT | Mod: PBBFAC,,, | Performed by: NURSE PRACTITIONER

## 2019-12-10 PROCEDURE — 99999 PR PBB SHADOW E&M-EST. PATIENT-LVL III: CPT | Mod: PBBFAC,,, | Performed by: NURSE PRACTITIONER

## 2019-12-10 NOTE — PROGRESS NOTES
Subjective:       Patient ID: uJan Young is a 64 y.o. male.    Chief Complaint: Headache (pt has been having headaches in the morning.)    HPI: Pt presents to clinic today new to me with c/o headache. He reports that it has been for the last couple days. He reports that every morning he wakes up with slight headache and nausea. He reports that it is not a bad headache. Has not vomited. No fever. He reports that it gets better after asa. He has no light or sound sensitivity. No diarrhea, but did have loose stool this am. No fever. NO chest pain. No SOB. No sinus congestion. He is taking all his meds except asa as rx. He sees Dr bettina every 6 months. He denies urinary s/s. C/o pressure to top of head. Just aggrivating, nothing he can't handle. His grandson is also complaining of same s/s.     Also reports that he has been havong cramping in legs.   Review of Systems   Constitutional: Negative for chills and fever.   HENT: Negative for congestion, postnasal drip and sore throat.    Eyes: Negative for photophobia.   Respiratory: Negative for chest tightness and shortness of breath.    Cardiovascular: Negative for chest pain.   Gastrointestinal: Negative for abdominal distention, abdominal pain, blood in stool and vomiting.   Genitourinary: Negative for dysuria, flank pain and hematuria.   Musculoskeletal: Negative for back pain.   Skin: Negative for pallor.   Neurological: Positive for headaches. Negative for dizziness, seizures, facial asymmetry, speech difficulty and numbness.   Hematological: Does not bruise/bleed easily.   Psychiatric/Behavioral: Negative for agitation and suicidal ideas. The patient is not nervous/anxious.        Objective:      Physical Exam   Constitutional: He is oriented to person, place, and time. He appears well-developed and well-nourished.   HENT:   Head: Normocephalic and atraumatic.   Right Ear: External ear normal.   Left Ear: External ear normal.   Nose: Nose normal.   Mouth/Throat:  Oropharynx is clear and moist. No oropharyngeal exudate.   Eyes: Pupils are equal, round, and reactive to light. Conjunctivae and EOM are normal.   Neck: Normal range of motion. Neck supple. No JVD present. No thyromegaly present.   Cardiovascular: Normal rate, regular rhythm, normal heart sounds and intact distal pulses.   Pulmonary/Chest: Effort normal and breath sounds normal. No stridor. No respiratory distress. He has no wheezes.   Reduce sounds to left   Abdominal: Soft. Bowel sounds are normal. He exhibits no distension and no mass. There is no tenderness. There is no guarding.   Musculoskeletal: Normal range of motion. He exhibits no edema.   Lymphadenopathy:     He has no cervical adenopathy.   Neurological: He is alert and oriented to person, place, and time. He has normal reflexes. No cranial nerve deficit.   Skin: Skin is warm and dry. No rash noted. No pallor.   Psychiatric: He has a normal mood and affect.   Nursing note and vitals reviewed.      Assessment:       1. Colon cancer screening    2. Muscle cramps        Plan:     Problem List Items Addressed This Visit     None      Visit Diagnoses     Colon cancer screening    -  Primary    Relevant Orders    Fecal Immunochemical Test (iFOBT)    Muscle cramps        Relevant Orders    Basic metabolic panel    Magnesium        seems to be a little viral infection that making him have headache and stomach upset in am. Cont asa daily. No s/s now  Also check bmp and mag and ck for the cramping. No flu shot today as he is already not feeling well. Check labs in am.

## 2019-12-11 ENCOUNTER — LAB VISIT (OUTPATIENT)
Dept: LAB | Facility: HOSPITAL | Age: 64
End: 2019-12-11
Attending: NURSE PRACTITIONER
Payer: MEDICARE

## 2019-12-11 DIAGNOSIS — R25.2 MUSCLE CRAMPS: ICD-10-CM

## 2019-12-11 LAB
ANION GAP SERPL CALC-SCNC: 7 MMOL/L (ref 8–16)
BUN SERPL-MCNC: 20 MG/DL (ref 8–23)
CALCIUM SERPL-MCNC: 9.3 MG/DL (ref 8.7–10.5)
CHLORIDE SERPL-SCNC: 104 MMOL/L (ref 95–110)
CK SERPL-CCNC: 147 U/L (ref 20–200)
CO2 SERPL-SCNC: 29 MMOL/L (ref 23–29)
CREAT SERPL-MCNC: 0.9 MG/DL (ref 0.5–1.4)
EST. GFR  (AFRICAN AMERICAN): >60 ML/MIN/1.73 M^2
EST. GFR  (NON AFRICAN AMERICAN): >60 ML/MIN/1.73 M^2
GLUCOSE SERPL-MCNC: 163 MG/DL (ref 70–110)
MAGNESIUM SERPL-MCNC: 1.9 MG/DL (ref 1.6–2.6)
POTASSIUM SERPL-SCNC: 4.5 MMOL/L (ref 3.5–5.1)
SODIUM SERPL-SCNC: 140 MMOL/L (ref 136–145)

## 2019-12-11 PROCEDURE — 82550 ASSAY OF CK (CPK): CPT

## 2019-12-11 PROCEDURE — 83735 ASSAY OF MAGNESIUM: CPT

## 2019-12-11 PROCEDURE — 36415 COLL VENOUS BLD VENIPUNCTURE: CPT

## 2019-12-11 PROCEDURE — 80048 BASIC METABOLIC PNL TOTAL CA: CPT

## 2019-12-16 ENCOUNTER — TELEPHONE (OUTPATIENT)
Dept: INTERNAL MEDICINE | Facility: CLINIC | Age: 64
End: 2019-12-16

## 2019-12-16 NOTE — TELEPHONE ENCOUNTER
----- Message from Katlin Pena sent at 2019  9:16 AM CST -----  Contact: self  Juan Young  MRN: [id number]  : 1955  PCP: Soren Pineda  Home Phone      [phone]  Work Phone      Not on file.  Mobile          [phone]      MESSAGE:  Pt states needs to be seen today by  due to not feeling well lately. Pt states has headache.  Phone: [phone]

## 2019-12-17 ENCOUNTER — OFFICE VISIT (OUTPATIENT)
Dept: INTERNAL MEDICINE | Facility: CLINIC | Age: 64
End: 2019-12-17
Payer: MEDICARE

## 2019-12-17 VITALS
DIASTOLIC BLOOD PRESSURE: 76 MMHG | RESPIRATION RATE: 18 BRPM | WEIGHT: 251.31 LBS | HEART RATE: 70 BPM | BODY MASS INDEX: 30.6 KG/M2 | TEMPERATURE: 98 F | HEIGHT: 76 IN | SYSTOLIC BLOOD PRESSURE: 116 MMHG | OXYGEN SATURATION: 97 %

## 2019-12-17 DIAGNOSIS — G44.219 EPISODIC TENSION-TYPE HEADACHE, NOT INTRACTABLE: Primary | ICD-10-CM

## 2019-12-17 PROCEDURE — 99999 PR PBB SHADOW E&M-EST. PATIENT-LVL III: CPT | Mod: PBBFAC,,, | Performed by: NURSE PRACTITIONER

## 2019-12-17 PROCEDURE — 99213 OFFICE O/P EST LOW 20 MIN: CPT | Mod: S$PBB,,, | Performed by: NURSE PRACTITIONER

## 2019-12-17 PROCEDURE — 99213 OFFICE O/P EST LOW 20 MIN: CPT | Mod: PBBFAC,PN | Performed by: NURSE PRACTITIONER

## 2019-12-17 PROCEDURE — 99999 PR PBB SHADOW E&M-EST. PATIENT-LVL III: ICD-10-PCS | Mod: PBBFAC,,, | Performed by: NURSE PRACTITIONER

## 2019-12-17 PROCEDURE — 99213 PR OFFICE/OUTPT VISIT, EST, LEVL III, 20-29 MIN: ICD-10-PCS | Mod: S$PBB,,, | Performed by: NURSE PRACTITIONER

## 2019-12-17 NOTE — PROGRESS NOTES
Subjective:       Patient ID: Juan Young is a 64 y.o. male.    Chief Complaint: Follow-up (X ER F/U- headache)    New to me but seen previously in clinic by a fellow provider. Pt presents to clinic for ED f/u for HA and dizziness. He was seen in ED yesterday after being evaluated at PCP office 1wk prior for same symptoms. Today reports marked improvement after taking first dose of elavil this AM.     Headache    This is a new problem. The current episode started 1 to 4 weeks ago. The problem occurs daily. The problem has been rapidly improving. The pain is located in the frontal, occipital and temporal region. The pain radiates to the face. The pain quality is similar to prior headaches. The quality of the pain is described as aching (pressure). The pain is at a severity of 2/10. The pain is mild. Pertinent negatives include no abdominal pain, abnormal behavior, anorexia, back pain, blurred vision, coughing, dizziness, drainage, ear pain, eye pain, eye redness, eye watering, facial sweating, fever, hearing loss, insomnia, loss of balance, muscle aches, nausea, neck pain, numbness, phonophobia, photophobia, rhinorrhea, scalp tenderness, seizures, sinus pressure, sore throat, swollen glands, tingling, tinnitus, visual change, vomiting, weakness or weight loss. The symptoms are aggravated by unknown. Treatments tried: elavil. The treatment provided significant relief. His past medical history is significant for hypertension. There is no history of cluster headaches, immunosuppression, migraine headaches, obesity, recent head traumas or sinus disease.     Review of Systems   Constitutional: Negative for activity change, appetite change, chills, diaphoresis, fatigue, fever, unexpected weight change and weight loss.   HENT: Negative for congestion, ear pain, hearing loss, postnasal drip, rhinorrhea, sinus pressure, sneezing, sore throat, tinnitus and voice change.    Eyes: Negative for blurred vision, photophobia,  pain, redness and visual disturbance.   Respiratory: Negative for cough, chest tightness and shortness of breath.    Cardiovascular: Negative for chest pain, palpitations and leg swelling.   Gastrointestinal: Negative for abdominal pain, anorexia, constipation, diarrhea, nausea and vomiting.   Endocrine: Negative.    Genitourinary: Negative for difficulty urinating.   Musculoskeletal: Negative for arthralgias, back pain, gait problem, myalgias and neck pain.   Skin: Negative for rash.   Allergic/Immunologic: Negative.    Neurological: Positive for headaches. Negative for dizziness, tingling, tremors, seizures, syncope, facial asymmetry, speech difficulty, weakness, light-headedness, numbness and loss of balance.   Hematological: Negative.    Psychiatric/Behavioral: Negative.  The patient does not have insomnia.    All other systems reviewed and are negative.      Objective:      Physical Exam   Constitutional: He is oriented to person, place, and time. Vital signs are normal. He appears well-developed and well-nourished. No distress.   HENT:   Head: Normocephalic and atraumatic.   Right Ear: External ear normal.   Left Ear: External ear normal.   Nose: Nose normal.   Eyes: Conjunctivae and lids are normal. Right eye exhibits no discharge. Left eye exhibits no discharge. No scleral icterus.   Neck: Phonation normal. Neck supple.   Pulmonary/Chest: Effort normal. No accessory muscle usage. No respiratory distress.   Abdominal: Normal appearance.   Neurological: He is alert and oriented to person, place, and time. He has normal strength. He exhibits normal muscle tone. Gait normal.   Grossly intact   Skin: Skin is warm, dry and intact. No rash noted.   Psychiatric: He has a normal mood and affect. His speech is normal and behavior is normal. Judgment and thought content normal. Cognition and memory are normal.   Nursing note and vitals reviewed.      Assessment:       1. Episodic tension-type headache, not intractable         Plan:       1. Episodic tension-type headache, not intractable  Recommendations: continue present treatment and plan, lie in darkened room and apply cold packs prn for pain, side effect profile discussed in detail, asked to keep headache diary and patient reassured that neurodiagnostic workup not indicated from benign H & P.         CLAUDETTE Graves, FNP-C  Family/Internal Medicine  [facility]

## 2019-12-19 ENCOUNTER — TELEPHONE (OUTPATIENT)
Dept: ADMINISTRATIVE | Facility: OTHER | Age: 64
End: 2019-12-19

## 2020-07-15 DIAGNOSIS — Z71.89 COMPLEX CARE COORDINATION: ICD-10-CM

## 2020-11-09 ENCOUNTER — OFFICE VISIT (OUTPATIENT)
Dept: INTERNAL MEDICINE | Facility: CLINIC | Age: 65
End: 2020-11-09
Payer: MEDICARE

## 2020-11-09 ENCOUNTER — HOSPITAL ENCOUNTER (OUTPATIENT)
Dept: RADIOLOGY | Facility: HOSPITAL | Age: 65
Discharge: HOME | End: 2020-11-09
Attending: INTERNAL MEDICINE
Payer: MEDICARE

## 2020-11-09 VITALS
WEIGHT: 243.63 LBS | OXYGEN SATURATION: 96 % | TEMPERATURE: 101 F | HEIGHT: 76 IN | RESPIRATION RATE: 19 BRPM | DIASTOLIC BLOOD PRESSURE: 60 MMHG | SYSTOLIC BLOOD PRESSURE: 112 MMHG | BODY MASS INDEX: 29.67 KG/M2 | HEART RATE: 97 BPM

## 2020-11-09 DIAGNOSIS — B96.89 ACUTE BACTERIAL BRONCHITIS: Primary | ICD-10-CM

## 2020-11-09 DIAGNOSIS — R50.9 FEVER, UNSPECIFIED FEVER CAUSE: ICD-10-CM

## 2020-11-09 DIAGNOSIS — R05.9 COUGH: ICD-10-CM

## 2020-11-09 DIAGNOSIS — J20.8 ACUTE BACTERIAL BRONCHITIS: Primary | ICD-10-CM

## 2020-11-09 LAB
CTP QC/QA: YES
POC MOLECULAR INFLUENZA A AGN: NEGATIVE
POC MOLECULAR INFLUENZA B AGN: NEGATIVE

## 2020-11-09 PROCEDURE — 99999 PR STA SHADOW: CPT | Mod: PBBFAC,,, | Performed by: INTERNAL MEDICINE

## 2020-11-09 PROCEDURE — U0003 INFECTIOUS AGENT DETECTION BY NUCLEIC ACID (DNA OR RNA); SEVERE ACUTE RESPIRATORY SYNDROME CORONAVIRUS 2 (SARS-COV-2) (CORONAVIRUS DISEASE [COVID-19]), AMPLIFIED PROBE TECHNIQUE, MAKING USE OF HIGH THROUGHPUT TECHNOLOGIES AS DESCRIBED BY CMS-2020-01-R: HCPCS

## 2020-11-09 PROCEDURE — 87502 INFLUENZA DNA AMP PROBE: CPT | Mod: PBBFAC | Performed by: INTERNAL MEDICINE

## 2020-11-09 PROCEDURE — 99999 PR PBB SHADOW E&M-EST. PATIENT-LVL IV: ICD-10-PCS | Mod: PBBFAC,,, | Performed by: INTERNAL MEDICINE

## 2020-11-09 PROCEDURE — 71046 X-RAY EXAM CHEST 2 VIEWS: CPT | Mod: TC

## 2020-11-09 PROCEDURE — 99213 OFFICE O/P EST LOW 20 MIN: CPT | Mod: S$PBB | Performed by: INTERNAL MEDICINE

## 2020-11-09 PROCEDURE — 99999 POCT INFLUENZA A/B MOLECULAR: CPT | Mod: PBBFAC,,, | Performed by: INTERNAL MEDICINE

## 2020-11-09 PROCEDURE — 99214 OFFICE O/P EST MOD 30 MIN: CPT | Mod: PBBFAC,25 | Performed by: INTERNAL MEDICINE

## 2020-11-09 PROCEDURE — 99999 PR PBB SHADOW E&M-EST. PATIENT-LVL IV: CPT | Mod: PBBFAC,,, | Performed by: INTERNAL MEDICINE

## 2020-11-09 RX ORDER — AZITHROMYCIN 250 MG/1
TABLET, FILM COATED ORAL
Qty: 6 TABLET | Refills: 0 | Status: SHIPPED | OUTPATIENT
Start: 2020-11-09 | End: 2020-11-14

## 2020-11-09 NOTE — PROGRESS NOTES
Subjective:       Patient ID: Juan Young is a 65 y.o. male.    Chief Complaint: Headache, Fatigue, and Fever      HPI:  Patient is known walter e from acute visit and presents with headaches and fatigue. Sx started 2 days ago. No fevers at home, temp 100.8 F. Last night took Advil and woke up diaphoretic; none today. Mild cough, not productive. No sneezing. No SOB or ROLAND. No loss or taste or smell. No sick contact.  No n/v/d/c or abd pain.     Past Medical History:   Diagnosis Date    Anticoagulant long-term use     CAD S/P percutaneous coronary angioplasty 1/26/2018    Hyperlipidemia     Hypertension     Myocardial infarction 2013    stents x 1       Family History   Problem Relation Age of Onset    Cancer Mother     Emphysema Mother     Stroke Father     Coronary artery disease Brother         4 brothers and sister with MI in there 50s and 60s    Heart attack Sister        Social History     Socioeconomic History    Marital status: Single     Spouse name: Not on file    Number of children: Not on file    Years of education: Not on file    Highest education level: Not on file   Occupational History    Not on file   Social Needs    Financial resource strain: Not on file    Food insecurity     Worry: Not on file     Inability: Not on file    Transportation needs     Medical: Not on file     Non-medical: Not on file   Tobacco Use    Smoking status: Never Smoker    Smokeless tobacco: Never Used   Substance and Sexual Activity    Alcohol use: No    Drug use: No    Sexual activity: Yes   Lifestyle    Physical activity     Days per week: Not on file     Minutes per session: Not on file    Stress: Not on file   Relationships    Social connections     Talks on phone: Not on file     Gets together: Not on file     Attends [employer] service: Not on file     Active member of club or organization: Not on file     Attends meetings of clubs or organizations: Not on file     Relationship status: Not on  "file   Other Topics Concern    Not on file   Social History Narrative    Not on file       Review of Systems   Constitutional: Positive for fatigue and fever. Negative for activity change and unexpected weight change.   HENT: Negative for congestion, ear pain, hearing loss, rhinorrhea and sore throat.    Eyes: Negative for pain, redness and visual disturbance.   Respiratory: Positive for cough. Negative for shortness of breath and wheezing.    Cardiovascular: Negative for chest pain, palpitations and leg swelling.   Gastrointestinal: Negative for abdominal pain, constipation, diarrhea, nausea and vomiting.   Genitourinary: Negative for decreased urine volume, dysuria, frequency and urgency.   Musculoskeletal: Negative for back pain, joint swelling and neck pain.   Skin: Negative for color change, rash and wound.   Neurological: Negative for dizziness, tremors, weakness, light-headedness and headaches.         Objective:      Physical Exam  Vitals signs reviewed.   Constitutional:       General: He is not in acute distress.     Appearance: He is well-developed.   HENT:      Head: Normocephalic and atraumatic.      Right Ear: Tympanic membrane and external ear normal.      Left Ear: Tympanic membrane and external ear normal.      Nose: Nose normal.      Mouth/Throat:      Pharynx: No oropharyngeal exudate.   Eyes:      General:         Right eye: No discharge.         Left eye: No discharge.      Conjunctiva/sclera: Conjunctivae normal.      Pupils: Pupils are equal, round, and reactive to light.   Neck:      Musculoskeletal: Neck supple.      Thyroid: No thyromegaly.   Cardiovascular:      Rate and Rhythm: Normal rate and regular rhythm.      Heart sounds: No murmur.   Pulmonary:      Effort: Pulmonary effort is normal. No respiratory distress.      Breath sounds: Rhonchi (right lung) present. No wheezing or rales.      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 There is no distension.      Palpations: Abdomen is soft.      Tenderness: There is no abdominal tenderness. There is no guarding or rebound.   Lymphadenopathy:      Cervical: No cervical adenopathy.   Skin:     General: Skin is warm and dry.   Neurological:      Mental Status: He is alert and oriented to person, place, and time.      Cranial Nerves: No cranial nerve deficit.   Psychiatric:         Behavior: Behavior normal.         Assessment:       1. Fever, unspecified fever cause        Plan:       Juan was seen today for headache, fatigue and fever.    Diagnoses and all orders for this visit:    Fever, unspecified fever cause  -     POCT Influenza A/B Molecular  -     X-Ray Chest PA And Lateral; Future      Flu negative  + rhonchi on exam--sent in azithromycin for now  CXR today, will call with result  COVID swab today. Discussed self quarantine, mask wearing, hand hygiene and social distancing at length today. Will call with results

## 2020-11-10 LAB — SARS-COV-2 RNA RESP QL NAA+PROBE: NOT DETECTED

## 2020-11-16 ENCOUNTER — OFFICE VISIT (OUTPATIENT)
Dept: INTERNAL MEDICINE | Facility: CLINIC | Age: 65
End: 2020-11-16
Payer: MEDICARE

## 2020-11-16 ENCOUNTER — LAB VISIT (OUTPATIENT)
Dept: LAB | Facility: HOSPITAL | Age: 65
End: 2020-11-16
Attending: NURSE PRACTITIONER
Payer: MEDICARE

## 2020-11-16 VITALS
TEMPERATURE: 97 F | DIASTOLIC BLOOD PRESSURE: 74 MMHG | HEART RATE: 64 BPM | BODY MASS INDEX: 29.45 KG/M2 | HEIGHT: 76 IN | WEIGHT: 241.88 LBS | RESPIRATION RATE: 18 BRPM | SYSTOLIC BLOOD PRESSURE: 116 MMHG

## 2020-11-16 DIAGNOSIS — I24.9 ACS (ACUTE CORONARY SYNDROME): ICD-10-CM

## 2020-11-16 DIAGNOSIS — R51.9 NONINTRACTABLE HEADACHE, UNSPECIFIED CHRONICITY PATTERN, UNSPECIFIED HEADACHE TYPE: ICD-10-CM

## 2020-11-16 DIAGNOSIS — R53.83 FATIGUE, UNSPECIFIED TYPE: Primary | ICD-10-CM

## 2020-11-16 DIAGNOSIS — R53.83 FATIGUE, UNSPECIFIED TYPE: ICD-10-CM

## 2020-11-16 LAB
ALBUMIN SERPL BCP-MCNC: 4.1 G/DL (ref 3.5–5.2)
ALP SERPL-CCNC: 102 U/L (ref 55–135)
ALT SERPL W/O P-5'-P-CCNC: 31 U/L (ref 10–44)
ANION GAP SERPL CALC-SCNC: 8 MMOL/L (ref 8–16)
AST SERPL-CCNC: 21 U/L (ref 10–40)
BASOPHILS # BLD AUTO: 0.11 K/UL (ref 0–0.2)
BASOPHILS NFR BLD: 1.1 % (ref 0–1.9)
BILIRUB SERPL-MCNC: 0.8 MG/DL (ref 0.1–1)
BUN SERPL-MCNC: 20 MG/DL (ref 8–23)
CALCIUM SERPL-MCNC: 9.4 MG/DL (ref 8.7–10.5)
CHLORIDE SERPL-SCNC: 104 MMOL/L (ref 95–110)
CO2 SERPL-SCNC: 27 MMOL/L (ref 23–29)
CREAT SERPL-MCNC: 0.8 MG/DL (ref 0.5–1.4)
DIFFERENTIAL METHOD: ABNORMAL
EOSINOPHIL # BLD AUTO: 0.2 K/UL (ref 0–0.5)
EOSINOPHIL NFR BLD: 1.9 % (ref 0–8)
ERYTHROCYTE [DISTWIDTH] IN BLOOD BY AUTOMATED COUNT: 13.6 % (ref 11.5–14.5)
EST. GFR  (AFRICAN AMERICAN): >60 ML/MIN/1.73 M^2
EST. GFR  (NON AFRICAN AMERICAN): >60 ML/MIN/1.73 M^2
GLUCOSE SERPL-MCNC: 103 MG/DL (ref 70–110)
HCT VFR BLD AUTO: 40.6 % (ref 40–54)
HGB BLD-MCNC: 13.6 G/DL (ref 14–18)
IMM GRANULOCYTES # BLD AUTO: 0.11 K/UL (ref 0–0.04)
IMM GRANULOCYTES NFR BLD AUTO: 1.1 % (ref 0–0.5)
LYMPHOCYTES # BLD AUTO: 2.4 K/UL (ref 1–4.8)
LYMPHOCYTES NFR BLD: 24.1 % (ref 18–48)
MCH RBC QN AUTO: 30.2 PG (ref 27–31)
MCHC RBC AUTO-ENTMCNC: 33.5 G/DL (ref 32–36)
MCV RBC AUTO: 90 FL (ref 82–98)
MONOCYTES # BLD AUTO: 0.7 K/UL (ref 0.3–1)
MONOCYTES NFR BLD: 6.4 % (ref 4–15)
NEUTROPHILS # BLD AUTO: 6.6 K/UL (ref 1.8–7.7)
NEUTROPHILS NFR BLD: 65.4 % (ref 38–73)
NRBC BLD-RTO: 0 /100 WBC
PLATELET # BLD AUTO: 308 K/UL (ref 150–350)
PMV BLD AUTO: 10 FL (ref 9.2–12.9)
POTASSIUM SERPL-SCNC: 4.2 MMOL/L (ref 3.5–5.1)
PROT SERPL-MCNC: 7.6 G/DL (ref 6–8.4)
RBC # BLD AUTO: 4.5 M/UL (ref 4.6–6.2)
SODIUM SERPL-SCNC: 139 MMOL/L (ref 136–145)
WBC # BLD AUTO: 10.1 K/UL (ref 3.9–12.7)

## 2020-11-16 PROCEDURE — 99213 OFFICE O/P EST LOW 20 MIN: CPT | Mod: PBBFAC,PN | Performed by: NURSE PRACTITIONER

## 2020-11-16 PROCEDURE — 99214 PR OFFICE/OUTPT VISIT, EST, LEVL IV, 30-39 MIN: ICD-10-PCS | Mod: S$PBB,,, | Performed by: NURSE PRACTITIONER

## 2020-11-16 PROCEDURE — 36415 COLL VENOUS BLD VENIPUNCTURE: CPT

## 2020-11-16 PROCEDURE — 99999 PR PBB SHADOW E&M-EST. PATIENT-LVL III: ICD-10-PCS | Mod: PBBFAC,,, | Performed by: NURSE PRACTITIONER

## 2020-11-16 PROCEDURE — 80053 COMPREHEN METABOLIC PANEL: CPT

## 2020-11-16 PROCEDURE — 99214 OFFICE O/P EST MOD 30 MIN: CPT | Mod: S$PBB,,, | Performed by: NURSE PRACTITIONER

## 2020-11-16 PROCEDURE — 99999 PR PBB SHADOW E&M-EST. PATIENT-LVL III: CPT | Mod: PBBFAC,,, | Performed by: NURSE PRACTITIONER

## 2020-11-16 PROCEDURE — 85025 COMPLETE CBC W/AUTO DIFF WBC: CPT

## 2020-11-16 NOTE — PROGRESS NOTES
Subjective:       Patient ID: Juan Young is a 65 y.o. male.    Chief Complaint: Fatigue (can't stay asleep at night) and Headache    Patient is known, to me and presents with   Chief Complaint   Patient presents with    Fatigue     can't stay asleep at night    Headache   .  Denies chest pain and shortness of breath.  Patient presents with fatigue and headache. Had an URI a week ago and tested negative for covid. Still with some fatigue and headaches. Would like something for insomnia.   HPI  Review of Systems   Constitutional: Positive for fatigue. Negative for activity change, appetite change, chills, diaphoresis, fever and unexpected weight change.   HENT: Negative.  Negative for congestion, ear discharge, ear pain, facial swelling, hearing loss, nosebleeds, postnasal drip, rhinorrhea, sinus pressure, sneezing, sore throat, tinnitus, trouble swallowing and voice change.    Eyes: Negative.  Negative for photophobia, pain, discharge, redness, itching and visual disturbance.   Respiratory: Negative.  Negative for apnea, cough, choking, chest tightness, shortness of breath, wheezing and stridor.    Cardiovascular: Negative.  Negative for chest pain, palpitations and leg swelling.   Gastrointestinal: Negative for abdominal distention, abdominal pain, anal bleeding, blood in stool, constipation, diarrhea, nausea and vomiting.   Genitourinary: Negative.  Negative for difficulty urinating, discharge, dysuria, enuresis, flank pain, frequency, hematuria, penile pain, penile swelling, scrotal swelling, testicular pain and urgency.   Musculoskeletal: Negative.  Negative for arthralgias, back pain, gait problem, joint swelling, myalgias, neck pain and neck stiffness.   Skin: Negative.  Negative for color change, pallor, rash and wound.   Neurological: Negative for dizziness, tremors, seizures, syncope, facial asymmetry, speech difficulty, weakness, light-headedness, numbness and headaches.   Hematological: Negative for  adenopathy. Does not bruise/bleed easily.   Psychiatric/Behavioral: Positive for sleep disturbance. Negative for agitation, dysphoric mood and suicidal ideas. The patient is not nervous/anxious.        Objective:      Physical Exam  Vitals signs and nursing note reviewed.   Constitutional:       General: He is not in acute distress.     Appearance: He is well-developed. He is not diaphoretic.   HENT:      Head: Normocephalic and atraumatic.      Right Ear: External ear normal.      Left Ear: External ear normal.      Nose: Nose normal.      Mouth/Throat:      Pharynx: No oropharyngeal exudate.   Eyes:      General:         Right eye: No discharge.         Left eye: No discharge.      Conjunctiva/sclera: Conjunctivae normal.      Pupils: Pupils are equal, round, and reactive to light.   Neck:      Musculoskeletal: Normal range of motion and neck supple.      Thyroid: No thyromegaly.      Vascular: No JVD.      Trachea: No tracheal deviation.   Cardiovascular:      Rate and Rhythm: Normal rate and regular rhythm.      Heart sounds: Normal heart sounds. No murmur. No friction rub. No gallop.    Pulmonary:      Effort: Pulmonary effort is normal. No respiratory distress.      Breath sounds: Normal breath sounds. No stridor. No wheezing or rales.   Chest:      Chest wall: No tenderness.   Abdominal:      General: Bowel sounds are normal. There is no distension.      Palpations: Abdomen is soft.      Tenderness: There is no abdominal tenderness. There is no guarding or rebound.   Musculoskeletal: Normal range of motion.         General: No tenderness.   Lymphadenopathy:      Cervical: No cervical adenopathy.   Skin:     General: Skin is warm and dry.      Capillary Refill: Capillary refill takes less than 2 seconds.      Coloration: Skin is not pale.      Findings: No erythema or rash.   Neurological:      General: No focal deficit present.      Mental Status: He is alert and oriented to person, place, and time.       "Cranial Nerves: No cranial nerve deficit.      Sensory: No sensory deficit.      Motor: No weakness or abnormal muscle tone.      Coordination: Coordination normal.      Gait: Gait normal.      Deep Tendon Reflexes: Reflexes are normal and symmetric. Reflexes normal.   Psychiatric:         Mood and Affect: Mood normal.         Behavior: Behavior normal.         Thought Content: Thought content normal.         Judgment: Judgment normal.         Assessment:       1. Fatigue, unspecified type    2. Nonintractable headache, unspecified chronicity pattern, unspecified headache type    3. ACS (acute coronary syndrome)        Plan:   Juan was seen today for fatigue and headache.    Diagnoses and all orders for this visit:    Fatigue, unspecified type  -     CBC Auto Differential; Future  -     Comprehensive Metabolic Panel; Future    Nonintractable headache, unspecified chronicity pattern, unspecified headache type  -     CBC Auto Differential; Future  -     Comprehensive Metabolic Panel; Future    ACS (acute coronary syndrome)      "This note will not be shared with the patient."  Once I review labs will call patient  Will hold off for something to sleep until I view labs  Continue with plan of care for now   rtc as scheduled  "

## 2020-12-01 ENCOUNTER — TELEPHONE (OUTPATIENT)
Dept: INTERNAL MEDICINE | Facility: CLINIC | Age: 65
End: 2020-12-01

## 2020-12-01 DIAGNOSIS — G47.00 INSOMNIA, UNSPECIFIED TYPE: Primary | ICD-10-CM

## 2020-12-01 RX ORDER — TRAZODONE HYDROCHLORIDE 50 MG/1
50 TABLET ORAL NIGHTLY PRN
Qty: 30 TABLET | Refills: 2 | Status: SHIPPED | OUTPATIENT
Start: 2020-12-01 | End: 2022-01-07

## 2020-12-01 NOTE — TELEPHONE ENCOUNTER
Pt states when he seen you yall talked about his trouble with sleeping stated that you would review BW first and than see about calling him in something  please advise  thanks  WM

## 2020-12-18 RX ORDER — ATORVASTATIN CALCIUM 80 MG/1
80 TABLET, FILM COATED ORAL DAILY
Qty: 90 TABLET | Refills: 0 | Status: SHIPPED | OUTPATIENT
Start: 2020-12-18 | End: 2021-07-12

## 2020-12-18 RX ORDER — ATORVASTATIN CALCIUM 80 MG/1
TABLET, FILM COATED ORAL
Qty: 90 TABLET | Refills: 0 | Status: SHIPPED | OUTPATIENT
Start: 2020-12-18 | End: 2020-12-18 | Stop reason: SDUPTHER

## 2021-03-23 PROBLEM — R94.39 ABNORMAL MYOCARDIAL PERFUSION STUDY: Status: ACTIVE | Noted: 2021-03-23

## 2021-03-23 PROBLEM — I25.10 CAD (CORONARY ARTERY DISEASE): Status: ACTIVE | Noted: 2018-01-26

## 2021-06-07 ENCOUNTER — TELEPHONE (OUTPATIENT)
Dept: INTERNAL MEDICINE | Facility: CLINIC | Age: 66
End: 2021-06-07

## 2021-06-07 DIAGNOSIS — K04.7 TOOTH ABSCESS: Primary | ICD-10-CM

## 2021-06-07 RX ORDER — AMOXICILLIN 500 MG/1
500 CAPSULE ORAL EVERY 12 HOURS
Qty: 14 CAPSULE | Refills: 0 | Status: SHIPPED | OUTPATIENT
Start: 2021-06-07 | End: 2021-06-14

## 2021-06-18 ENCOUNTER — OFFICE VISIT (OUTPATIENT)
Dept: INTERNAL MEDICINE | Facility: CLINIC | Age: 66
End: 2021-06-18
Payer: COMMERCIAL

## 2021-06-18 VITALS
SYSTOLIC BLOOD PRESSURE: 112 MMHG | HEIGHT: 76 IN | OXYGEN SATURATION: 95 % | DIASTOLIC BLOOD PRESSURE: 72 MMHG | RESPIRATION RATE: 16 BRPM | WEIGHT: 258.81 LBS | BODY MASS INDEX: 31.51 KG/M2 | HEART RATE: 67 BPM

## 2021-06-18 DIAGNOSIS — I10 ESSENTIAL HYPERTENSION: ICD-10-CM

## 2021-06-18 DIAGNOSIS — R42 DIZZINESS: ICD-10-CM

## 2021-06-18 DIAGNOSIS — H81.10 BENIGN PAROXYSMAL POSITIONAL VERTIGO, UNSPECIFIED LATERALITY: Primary | ICD-10-CM

## 2021-06-18 DIAGNOSIS — H61.22 LEFT EAR IMPACTED CERUMEN: ICD-10-CM

## 2021-06-18 DIAGNOSIS — I25.10 CORONARY ARTERY DISEASE, ANGINA PRESENCE UNSPECIFIED, UNSPECIFIED VESSEL OR LESION TYPE, UNSPECIFIED WHETHER NATIVE OR TRANSPLANTED HEART: ICD-10-CM

## 2021-06-18 PROCEDURE — 93005 ELECTROCARDIOGRAM TRACING: CPT | Mod: S$GLB,,, | Performed by: NURSE PRACTITIONER

## 2021-06-18 PROCEDURE — 99214 OFFICE O/P EST MOD 30 MIN: CPT | Mod: S$GLB,,, | Performed by: NURSE PRACTITIONER

## 2021-06-18 PROCEDURE — 93005 EKG 12-LEAD: ICD-10-PCS | Mod: S$GLB,,, | Performed by: NURSE PRACTITIONER

## 2021-06-18 PROCEDURE — 99999 PR PBB SHADOW E&M-EST. PATIENT-LVL IV: CPT | Mod: PBBFAC,,, | Performed by: NURSE PRACTITIONER

## 2021-06-18 PROCEDURE — 99214 PR OFFICE/OUTPT VISIT, EST, LEVL IV, 30-39 MIN: ICD-10-PCS | Mod: S$GLB,,, | Performed by: NURSE PRACTITIONER

## 2021-06-18 PROCEDURE — 93010 EKG 12-LEAD: ICD-10-PCS | Mod: S$GLB,,, | Performed by: INTERNAL MEDICINE

## 2021-06-18 PROCEDURE — 99999 PR PBB SHADOW E&M-EST. PATIENT-LVL IV: ICD-10-PCS | Mod: PBBFAC,,, | Performed by: NURSE PRACTITIONER

## 2021-06-18 PROCEDURE — 93010 ELECTROCARDIOGRAM REPORT: CPT | Mod: S$GLB,,, | Performed by: INTERNAL MEDICINE

## 2021-06-18 RX ORDER — MECLIZINE HYDROCHLORIDE 25 MG/1
25 TABLET ORAL 3 TIMES DAILY PRN
Qty: 30 TABLET | Refills: 0 | Status: SHIPPED | OUTPATIENT
Start: 2021-06-18 | End: 2022-01-07

## 2021-06-21 ENCOUNTER — HOSPITAL ENCOUNTER (EMERGENCY)
Facility: HOSPITAL | Age: 66
Discharge: HOME | End: 2021-06-21
Attending: SURGERY
Payer: COMMERCIAL

## 2021-06-21 VITALS
RESPIRATION RATE: 12 BRPM | BODY MASS INDEX: 31.48 KG/M2 | TEMPERATURE: 97 F | WEIGHT: 258.63 LBS | SYSTOLIC BLOOD PRESSURE: 153 MMHG | HEART RATE: 64 BPM | DIASTOLIC BLOOD PRESSURE: 72 MMHG | OXYGEN SATURATION: 99 %

## 2021-06-21 DIAGNOSIS — R42 DIZZINESS: ICD-10-CM

## 2021-06-21 DIAGNOSIS — E86.0 DEHYDRATION: Primary | ICD-10-CM

## 2021-06-21 LAB
ALBUMIN SERPL BCP-MCNC: 3.8 G/DL (ref 3.5–5.2)
ALP SERPL-CCNC: 106 U/L (ref 55–135)
ALT SERPL W/O P-5'-P-CCNC: 33 U/L (ref 10–44)
ANION GAP SERPL CALC-SCNC: 10 MMOL/L (ref 8–16)
AST SERPL-CCNC: 20 U/L (ref 10–40)
BASOPHILS # BLD AUTO: 0.07 K/UL (ref 0–0.2)
BASOPHILS NFR BLD: 1.1 % (ref 0–1.9)
BILIRUB SERPL-MCNC: 0.6 MG/DL (ref 0.1–1)
BILIRUB UR QL STRIP: NEGATIVE
BUN SERPL-MCNC: 15 MG/DL (ref 8–23)
CALCIUM SERPL-MCNC: 9.7 MG/DL (ref 8.7–10.5)
CHLORIDE SERPL-SCNC: 105 MMOL/L (ref 95–110)
CLARITY UR: CLEAR
CO2 SERPL-SCNC: 24 MMOL/L (ref 23–29)
COLOR UR: YELLOW
CREAT SERPL-MCNC: 0.8 MG/DL (ref 0.5–1.4)
DIFFERENTIAL METHOD: ABNORMAL
EOSINOPHIL # BLD AUTO: 0.2 K/UL (ref 0–0.5)
EOSINOPHIL NFR BLD: 2.5 % (ref 0–8)
ERYTHROCYTE [DISTWIDTH] IN BLOOD BY AUTOMATED COUNT: 12.3 % (ref 11.5–14.5)
EST. GFR  (AFRICAN AMERICAN): >60 ML/MIN/1.73 M^2
EST. GFR  (NON AFRICAN AMERICAN): >60 ML/MIN/1.73 M^2
GLUCOSE SERPL-MCNC: 104 MG/DL (ref 70–110)
GLUCOSE UR QL STRIP: NEGATIVE
HCT VFR BLD AUTO: 41.4 % (ref 40–54)
HGB BLD-MCNC: 14.4 G/DL (ref 14–18)
HGB UR QL STRIP: ABNORMAL
IMM GRANULOCYTES # BLD AUTO: 0.02 K/UL (ref 0–0.04)
IMM GRANULOCYTES NFR BLD AUTO: 0.3 % (ref 0–0.5)
INFLUENZA A, MOLECULAR: NEGATIVE
INFLUENZA B, MOLECULAR: NEGATIVE
INR PPP: 1 (ref 0.8–1.2)
KETONES UR QL STRIP: NEGATIVE
LACTATE SERPL-SCNC: 1.4 MMOL/L (ref 0.5–2.2)
LEUKOCYTE ESTERASE UR QL STRIP: NEGATIVE
LYMPHOCYTES # BLD AUTO: 1.6 K/UL (ref 1–4.8)
LYMPHOCYTES NFR BLD: 24.5 % (ref 18–48)
MCH RBC QN AUTO: 31.6 PG (ref 27–31)
MCHC RBC AUTO-ENTMCNC: 34.8 G/DL (ref 32–36)
MCV RBC AUTO: 91 FL (ref 82–98)
MONOCYTES # BLD AUTO: 0.5 K/UL (ref 0.3–1)
MONOCYTES NFR BLD: 8.1 % (ref 4–15)
NEUTROPHILS # BLD AUTO: 4.1 K/UL (ref 1.8–7.7)
NEUTROPHILS NFR BLD: 63.5 % (ref 38–73)
NITRITE UR QL STRIP: NEGATIVE
NRBC BLD-RTO: 0 /100 WBC
PH UR STRIP: 6 [PH] (ref 5–8)
PLATELET # BLD AUTO: 216 K/UL (ref 150–450)
PMV BLD AUTO: 11.3 FL (ref 9.2–12.9)
POTASSIUM SERPL-SCNC: 4.1 MMOL/L (ref 3.5–5.1)
PROT SERPL-MCNC: 7.1 G/DL (ref 6–8.4)
PROT UR QL STRIP: NEGATIVE
PROTHROMBIN TIME: 10.8 SEC (ref 9–12.5)
RBC # BLD AUTO: 4.55 M/UL (ref 4.6–6.2)
SARS-COV-2 RDRP RESP QL NAA+PROBE: NEGATIVE
SODIUM SERPL-SCNC: 139 MMOL/L (ref 136–145)
SP GR UR STRIP: >=1.03 (ref 1–1.03)
SPECIMEN SOURCE: NORMAL
TROPONIN I SERPL DL<=0.01 NG/ML-MCNC: <0.006 NG/ML (ref 0–0.03)
URN SPEC COLLECT METH UR: ABNORMAL
UROBILINOGEN UR STRIP-ACNC: NEGATIVE EU/DL
WBC # BLD AUTO: 6.41 K/UL (ref 3.9–12.7)

## 2021-06-21 PROCEDURE — 63600175 PHARM REV CODE 636 W HCPCS: Performed by: SURGERY

## 2021-06-21 PROCEDURE — U0002 COVID-19 LAB TEST NON-CDC: HCPCS | Performed by: SURGERY

## 2021-06-21 PROCEDURE — 96361 HYDRATE IV INFUSION ADD-ON: CPT

## 2021-06-21 PROCEDURE — 80053 COMPREHEN METABOLIC PANEL: CPT | Performed by: SURGERY

## 2021-06-21 PROCEDURE — 85610 PROTHROMBIN TIME: CPT | Performed by: SURGERY

## 2021-06-21 PROCEDURE — 96374 THER/PROPH/DIAG INJ IV PUSH: CPT

## 2021-06-21 PROCEDURE — 83605 ASSAY OF LACTIC ACID: CPT | Performed by: SURGERY

## 2021-06-21 PROCEDURE — 93005 ELECTROCARDIOGRAM TRACING: CPT

## 2021-06-21 PROCEDURE — 87502 INFLUENZA DNA AMP PROBE: CPT | Performed by: SURGERY

## 2021-06-21 PROCEDURE — 84484 ASSAY OF TROPONIN QUANT: CPT | Performed by: SURGERY

## 2021-06-21 PROCEDURE — 25000003 PHARM REV CODE 250: Performed by: SURGERY

## 2021-06-21 PROCEDURE — 85025 COMPLETE CBC W/AUTO DIFF WBC: CPT | Performed by: SURGERY

## 2021-06-21 PROCEDURE — 99285 EMERGENCY DEPT VISIT HI MDM: CPT | Mod: 25

## 2021-06-21 PROCEDURE — 93010 EKG 12-LEAD: ICD-10-PCS | Mod: ,,, | Performed by: INTERNAL MEDICINE

## 2021-06-21 PROCEDURE — 93010 ELECTROCARDIOGRAM REPORT: CPT | Mod: ,,, | Performed by: INTERNAL MEDICINE

## 2021-06-21 PROCEDURE — 81003 URINALYSIS AUTO W/O SCOPE: CPT | Performed by: SURGERY

## 2021-06-21 PROCEDURE — 36415 COLL VENOUS BLD VENIPUNCTURE: CPT | Performed by: SURGERY

## 2021-06-21 RX ORDER — SODIUM CHLORIDE 9 MG/ML
INJECTION, SOLUTION INTRAVENOUS
Status: COMPLETED | OUTPATIENT
Start: 2021-06-21 | End: 2021-06-21

## 2021-06-21 RX ORDER — KETOROLAC TROMETHAMINE 30 MG/ML
15 INJECTION, SOLUTION INTRAMUSCULAR; INTRAVENOUS
Status: COMPLETED | OUTPATIENT
Start: 2021-06-21 | End: 2021-06-21

## 2021-06-21 RX ADMIN — SODIUM CHLORIDE: 0.9 INJECTION, SOLUTION INTRAVENOUS at 02:06

## 2021-06-21 RX ADMIN — KETOROLAC TROMETHAMINE 15 MG: 30 INJECTION, SOLUTION INTRAMUSCULAR; INTRAVENOUS at 02:06

## 2021-06-22 ENCOUNTER — TELEPHONE (OUTPATIENT)
Dept: INTERNAL MEDICINE | Facility: CLINIC | Age: 66
End: 2021-06-22

## 2021-06-22 ENCOUNTER — OFFICE VISIT (OUTPATIENT)
Dept: INTERNAL MEDICINE | Facility: CLINIC | Age: 66
End: 2021-06-22
Payer: COMMERCIAL

## 2021-06-22 ENCOUNTER — PATIENT OUTREACH (OUTPATIENT)
Dept: EMERGENCY MEDICINE | Facility: HOSPITAL | Age: 66
End: 2021-06-22

## 2021-06-22 VITALS
BODY MASS INDEX: 31.51 KG/M2 | HEART RATE: 63 BPM | SYSTOLIC BLOOD PRESSURE: 120 MMHG | HEIGHT: 76 IN | RESPIRATION RATE: 16 BRPM | WEIGHT: 258.81 LBS | OXYGEN SATURATION: 97 % | DIASTOLIC BLOOD PRESSURE: 62 MMHG

## 2021-06-22 DIAGNOSIS — I10 ESSENTIAL HYPERTENSION: ICD-10-CM

## 2021-06-22 DIAGNOSIS — I25.10 CORONARY ARTERY DISEASE, ANGINA PRESENCE UNSPECIFIED, UNSPECIFIED VESSEL OR LESION TYPE, UNSPECIFIED WHETHER NATIVE OR TRANSPLANTED HEART: ICD-10-CM

## 2021-06-22 DIAGNOSIS — R42 VERTIGO: Primary | ICD-10-CM

## 2021-06-22 PROCEDURE — 99213 OFFICE O/P EST LOW 20 MIN: CPT | Mod: S$GLB,,, | Performed by: NURSE PRACTITIONER

## 2021-06-22 PROCEDURE — 99999 PR PBB SHADOW E&M-EST. PATIENT-LVL IV: ICD-10-PCS | Mod: PBBFAC,,, | Performed by: NURSE PRACTITIONER

## 2021-06-22 PROCEDURE — 99999 PR PBB SHADOW E&M-EST. PATIENT-LVL IV: CPT | Mod: PBBFAC,,, | Performed by: NURSE PRACTITIONER

## 2021-06-22 PROCEDURE — 99213 PR OFFICE/OUTPT VISIT, EST, LEVL III, 20-29 MIN: ICD-10-PCS | Mod: S$GLB,,, | Performed by: NURSE PRACTITIONER

## 2021-06-23 ENCOUNTER — TELEPHONE (OUTPATIENT)
Dept: INTERNAL MEDICINE | Facility: CLINIC | Age: 66
End: 2021-06-23

## 2021-06-24 ENCOUNTER — TELEPHONE (OUTPATIENT)
Dept: INTERNAL MEDICINE | Facility: CLINIC | Age: 66
End: 2021-06-24

## 2021-06-24 ENCOUNTER — PES CALL (OUTPATIENT)
Dept: ADMINISTRATIVE | Facility: CLINIC | Age: 66
End: 2021-06-24

## 2021-06-25 ENCOUNTER — TELEPHONE (OUTPATIENT)
Dept: OTOLARYNGOLOGY | Facility: CLINIC | Age: 66
End: 2021-06-25

## 2021-06-30 ENCOUNTER — TELEPHONE (OUTPATIENT)
Dept: INTERNAL MEDICINE | Facility: CLINIC | Age: 66
End: 2021-06-30

## 2022-01-06 ENCOUNTER — TELEPHONE (OUTPATIENT)
Dept: INTERNAL MEDICINE | Facility: CLINIC | Age: 67
End: 2022-01-06
Payer: MEDICARE

## 2022-01-06 NOTE — TELEPHONE ENCOUNTER
----- Message from Holly Stern MA sent at 2022  2:38 PM CST -----  Juan Winstonups  MRN: [id number]  : 1955  PCP: Soren Pineda  Home Phone      [phone]  Work Phone      Not on file.  Mobile          [phone]      MESSAGE: Patient left voicemail c/o bad stomach pain.  Wants appt., ASAP    Please Advise:[phone]

## 2022-01-07 ENCOUNTER — OFFICE VISIT (OUTPATIENT)
Dept: INTERNAL MEDICINE | Facility: CLINIC | Age: 67
End: 2022-01-07
Payer: MEDICARE

## 2022-01-07 VITALS
HEART RATE: 67 BPM | WEIGHT: 260.81 LBS | DIASTOLIC BLOOD PRESSURE: 86 MMHG | HEIGHT: 76 IN | BODY MASS INDEX: 31.76 KG/M2 | OXYGEN SATURATION: 97 % | SYSTOLIC BLOOD PRESSURE: 124 MMHG | RESPIRATION RATE: 18 BRPM

## 2022-01-07 DIAGNOSIS — F41.9 ANXIETY: ICD-10-CM

## 2022-01-07 DIAGNOSIS — Z23 NEEDS FLU SHOT: ICD-10-CM

## 2022-01-07 DIAGNOSIS — E66.9 OBESITY (BMI 30.0-34.9): ICD-10-CM

## 2022-01-07 DIAGNOSIS — I20.9 ANGINA, CLASS III: ICD-10-CM

## 2022-01-07 DIAGNOSIS — G47.33 OSA (OBSTRUCTIVE SLEEP APNEA): ICD-10-CM

## 2022-01-07 PROBLEM — E66.811 OBESITY (BMI 30.0-34.9): Status: ACTIVE | Noted: 2022-01-07

## 2022-01-07 PROCEDURE — 99999 PR PBB SHADOW E&M-EST. PATIENT-LVL III: ICD-10-PCS | Mod: PBBFAC,,, | Performed by: NURSE PRACTITIONER

## 2022-01-07 PROCEDURE — 99499 UNLISTED E&M SERVICE: CPT | Mod: S$GLB,,, | Performed by: NURSE PRACTITIONER

## 2022-01-07 PROCEDURE — 3008F BODY MASS INDEX DOCD: CPT | Mod: CPTII,S$GLB,, | Performed by: NURSE PRACTITIONER

## 2022-01-07 PROCEDURE — 1159F PR MEDICATION LIST DOCUMENTED IN MEDICAL RECORD: ICD-10-PCS | Mod: CPTII,S$GLB,, | Performed by: NURSE PRACTITIONER

## 2022-01-07 PROCEDURE — 3074F PR MOST RECENT SYSTOLIC BLOOD PRESSURE < 130 MM HG: ICD-10-PCS | Mod: CPTII,S$GLB,, | Performed by: NURSE PRACTITIONER

## 2022-01-07 PROCEDURE — G0008 FLU VACCINE - QUADRIVALENT - ADJUVANTED: ICD-10-PCS | Mod: S$GLB,,, | Performed by: NURSE PRACTITIONER

## 2022-01-07 PROCEDURE — 3079F PR MOST RECENT DIASTOLIC BLOOD PRESSURE 80-89 MM HG: ICD-10-PCS | Mod: CPTII,S$GLB,, | Performed by: NURSE PRACTITIONER

## 2022-01-07 PROCEDURE — 99214 PR OFFICE/OUTPT VISIT, EST, LEVL IV, 30-39 MIN: ICD-10-PCS | Mod: S$GLB,,, | Performed by: NURSE PRACTITIONER

## 2022-01-07 PROCEDURE — 1126F PR PAIN SEVERITY QUANTIFIED, NO PAIN PRESENT: ICD-10-PCS | Mod: CPTII,S$GLB,, | Performed by: NURSE PRACTITIONER

## 2022-01-07 PROCEDURE — 1126F AMNT PAIN NOTED NONE PRSNT: CPT | Mod: CPTII,S$GLB,, | Performed by: NURSE PRACTITIONER

## 2022-01-07 PROCEDURE — 3288F PR FALLS RISK ASSESSMENT DOCUMENTED: ICD-10-PCS | Mod: CPTII,S$GLB,, | Performed by: NURSE PRACTITIONER

## 2022-01-07 PROCEDURE — 90694 FLU VACCINE - QUADRIVALENT - ADJUVANTED: ICD-10-PCS | Mod: S$GLB,,, | Performed by: NURSE PRACTITIONER

## 2022-01-07 PROCEDURE — 99999 PR PBB SHADOW E&M-EST. PATIENT-LVL III: CPT | Mod: PBBFAC,,, | Performed by: NURSE PRACTITIONER

## 2022-01-07 PROCEDURE — 1101F PR PT FALLS ASSESS DOC 0-1 FALLS W/OUT INJ PAST YR: ICD-10-PCS | Mod: CPTII,S$GLB,, | Performed by: NURSE PRACTITIONER

## 2022-01-07 PROCEDURE — G0008 ADMIN INFLUENZA VIRUS VAC: HCPCS | Mod: S$GLB,,, | Performed by: NURSE PRACTITIONER

## 2022-01-07 PROCEDURE — 99499 RISK ADDL DX/OHS AUDIT: ICD-10-PCS | Mod: S$GLB,,, | Performed by: NURSE PRACTITIONER

## 2022-01-07 PROCEDURE — 3079F DIAST BP 80-89 MM HG: CPT | Mod: CPTII,S$GLB,, | Performed by: NURSE PRACTITIONER

## 2022-01-07 PROCEDURE — 3074F SYST BP LT 130 MM HG: CPT | Mod: CPTII,S$GLB,, | Performed by: NURSE PRACTITIONER

## 2022-01-07 PROCEDURE — 3288F FALL RISK ASSESSMENT DOCD: CPT | Mod: CPTII,S$GLB,, | Performed by: NURSE PRACTITIONER

## 2022-01-07 PROCEDURE — 1101F PT FALLS ASSESS-DOCD LE1/YR: CPT | Mod: CPTII,S$GLB,, | Performed by: NURSE PRACTITIONER

## 2022-01-07 PROCEDURE — 3008F PR BODY MASS INDEX (BMI) DOCUMENTED: ICD-10-PCS | Mod: CPTII,S$GLB,, | Performed by: NURSE PRACTITIONER

## 2022-01-07 PROCEDURE — 90694 VACC AIIV4 NO PRSRV 0.5ML IM: CPT | Mod: S$GLB,,, | Performed by: NURSE PRACTITIONER

## 2022-01-07 PROCEDURE — 99214 OFFICE O/P EST MOD 30 MIN: CPT | Mod: S$GLB,,, | Performed by: NURSE PRACTITIONER

## 2022-01-07 PROCEDURE — 1159F MED LIST DOCD IN RCRD: CPT | Mod: CPTII,S$GLB,, | Performed by: NURSE PRACTITIONER

## 2022-01-07 RX ORDER — AMLODIPINE BESYLATE 5 MG/1
5 TABLET ORAL DAILY
COMMUNITY
Start: 2021-12-20 | End: 2022-07-28

## 2022-01-07 RX ORDER — SERTRALINE HYDROCHLORIDE 25 MG/1
25 TABLET, FILM COATED ORAL DAILY
Qty: 30 TABLET | Refills: 2 | Status: SHIPPED | OUTPATIENT
Start: 2022-01-07 | End: 2022-04-27

## 2022-01-07 NOTE — PROGRESS NOTES
Subjective:       Patient ID: Juan Young is a 66 y.o. male.    Chief Complaint: Abdominal Pain (On R. Side- x few days but better today PS:0 ) and Anxiety (X ongoing- discuss meds )    Patient is known, to me and presents with   Chief Complaint   Patient presents with    Abdominal Pain     On R. Side- x few days but better today PS:0     Anxiety     X ongoing- discuss meds    .  Denies chest pain and shortness of breath.  Patient presents for check up and states that the discomfort he was feeling in his right lower abdomen has stopped. He states that he did lift on a heavy item and noticed a few days afterward he had a pain but it only lasted minutes. No changes in stool or blood in stool. No n/v. He states that he feels fine now. He does have issues with anxiety and is wishing to be placed on something for that. No sihi noted Also a few years ago had sleep studies an was dx with RENEE but insurance would not cover cpap so he is requesting another tests and hopefully h e can get on his cpap     HPI  Review of Systems   Constitutional: Negative.  Negative for activity change, appetite change, chills, diaphoresis, fatigue, fever and unexpected weight change.   HENT: Negative.  Negative for congestion, ear discharge, ear pain, facial swelling, hearing loss, nosebleeds, postnasal drip, rhinorrhea, sinus pressure, sneezing, sore throat, tinnitus, trouble swallowing and voice change.    Eyes: Negative.  Negative for photophobia, pain, discharge, redness, itching and visual disturbance.   Respiratory: Negative.  Negative for apnea, cough, choking, chest tightness, shortness of breath, wheezing and stridor.    Cardiovascular: Negative.  Negative for chest pain, palpitations and leg swelling.   Gastrointestinal: Negative for abdominal distention, abdominal pain, anal bleeding, blood in stool, constipation, diarrhea, nausea, rectal pain and vomiting.   Genitourinary: Negative.  Negative for difficulty urinating, dysuria,  enuresis, flank pain, frequency, hematuria, penile discharge, penile pain, penile swelling, scrotal swelling, testicular pain and urgency.   Musculoskeletal: Negative.  Negative for arthralgias, back pain, gait problem, joint swelling, myalgias, neck pain and neck stiffness.   Skin: Negative.  Negative for color change, pallor, rash and wound.   Neurological: Negative for dizziness, tremors, seizures, syncope, facial asymmetry, speech difficulty, weakness, light-headedness, numbness and headaches.   Hematological: Negative for adenopathy. Does not bruise/bleed easily.   Psychiatric/Behavioral: Positive for sleep disturbance. Negative for agitation, dysphoric mood and suicidal ideas. The patient is nervous/anxious.        Objective:      Physical Exam  Vitals and nursing note reviewed.   Constitutional:       General: He is not in acute distress.     Appearance: He is well-developed and well-nourished. He is not diaphoretic.   HENT:      Head: Normocephalic and atraumatic.      Right Ear: External ear normal.      Left Ear: External ear normal.      Nose: Nose normal.      Mouth/Throat:      Mouth: Oropharynx is clear and moist.      Pharynx: No oropharyngeal exudate.   Eyes:      General:         Right eye: No discharge.         Left eye: No discharge.      Extraocular Movements: EOM normal.      Conjunctiva/sclera: Conjunctivae normal.      Pupils: Pupils are equal, round, and reactive to light.   Neck:      Thyroid: No thyromegaly.      Vascular: No JVD.      Trachea: No tracheal deviation.   Cardiovascular:      Rate and Rhythm: Normal rate and regular rhythm.      Pulses: Intact distal pulses.      Heart sounds: Normal heart sounds. No murmur heard.  No friction rub. No gallop.    Pulmonary:      Effort: Pulmonary effort is normal. No respiratory distress.      Breath sounds: Normal breath sounds. No stridor. No wheezing or rales.   Chest:      Chest wall: No tenderness.   Abdominal:      General: Bowel sounds  "are normal. There is no distension.      Palpations: Abdomen is soft. There is no mass.      Tenderness: There is no abdominal tenderness. There is no right CVA tenderness, left CVA tenderness, guarding or rebound.      Hernia: No hernia is present.   Musculoskeletal:         General: No tenderness or edema. Normal range of motion.      Cervical back: Normal range of motion and neck supple.   Lymphadenopathy:      Cervical: No cervical adenopathy.   Skin:     General: Skin is warm and dry.      Capillary Refill: Capillary refill takes less than 2 seconds.      Coloration: Skin is not pale.      Findings: No erythema or rash.   Neurological:      General: No focal deficit present.      Mental Status: He is alert and oriented to person, place, and time.      Cranial Nerves: No cranial nerve deficit.      Motor: No abnormal muscle tone.      Coordination: Coordination normal.      Deep Tendon Reflexes: Reflexes are normal and symmetric. Reflexes normal.   Psychiatric:         Mood and Affect: Mood and affect and mood normal.         Behavior: Behavior normal.         Thought Content: Thought content normal.         Judgment: Judgment normal.      Comments: Negative sihi noted         Assessment:       1. Needs flu shot    2. Anxiety    3. Angina, class III    4. Obesity (BMI 30.0-34.9)    5. RENEE (obstructive sleep apnea)        Plan:   Juan was seen today for abdominal pain and anxiety.    Diagnoses and all orders for this visit:    Needs flu shot  -     Influenza (FLUAD) - Quadrivalent (Adjuvanted) *Preferred* (65+) (PF)    Anxiety  -     sertraline (ZOLOFT) 25 MG tablet; Take 1 tablet (25 mg total) by mouth once daily.    Angina, class III    Obesity (BMI 30.0-34.9)    RENEE (obstructive sleep apnea)  -     Polysomnography 4 or more parameters with CPAP; Future    "This note will not be shared with the patient."  I've explained to him that drugs of the SSRI class can have side effects such as weight gain, sexual " dysfunction, insomnia, headache, nausea. These medications are generally effective at alleviating symptoms of anxiety and/or depression. Let me know if significant side effects do occur.  If any thoughts of sihi occur stop meds and go to ER  If pain to right abdominal region occurs again will need to let us know  RTC in two weeks

## 2022-01-07 NOTE — PATIENT INSTRUCTIONS
Patient Education       Anxiety, Adult ED   General Information   You came to the Emergency Department (ED) for anxiety. This problem can cause you to feel very worried. It can also cause physical symptoms like chest pain, stomach aches, or trouble sleeping. While mild anxiety is a normal response to stress, it can cause you problems in your everyday life. You may need follow-up care to help manage your anxiety.  What care is needed at home?   · Call your regular doctor to let them know you were in the ED. Make a follow-up appointment if you were told to.  · Set a time to talk with a counselor about your worries and feelings. This can help you with your anxiety.  · Take care to follow all instructions when you take your medicines.  · Limit alcohol and caffeine.  · Learn ways to manage stress. Relaxation methods like reflection, deep breathing, and muscle relaxation may be helpful. Things like yoga, exercise, and kandi chi are also good.  · Talk about your feelings with family members and friends you trust. Talk to someone who can help you see how your thoughts at certain times may raise your anxiety.  When do I need to get emergency help?   · Call for an ambulance right away if:   ? You feel you may harm yourself or someone else  ? You can also call a mental health hotline for help.  · Return to the ED if:   ? You have any physical symptoms, such as chest pain, trouble breathing, or severe belly pain, that could be a sign of a serious problem.  When do I need to call the doctor?   · If you are short of breath.  · If you do not feel like you can be alone.  · You have new or worsening symptoms.  Last Reviewed Date   2020-06-25  Consumer Information Use and Disclaimer   This information is not specific medical advice and does not replace information you receive from your health care provider. This is only a brief summary of general information. It does NOT include all information about conditions, illnesses, injuries,  tests, procedures, treatments, therapies, discharge instructions or life-style choices that may apply to you. You must talk with your health care provider for complete information about your health and treatment options. This information should not be used to decide whether or not to accept your health care providers advice, instructions or recommendations. Only your health care provider has the knowledge and training to provide advice that is right for you.  Copyright   Copyright © 2021 [employer] and its affiliates and/or licensors. All rights reserved.

## 2022-03-04 NOTE — TELEPHONE ENCOUNTER
Care Due:                  Date            Visit Type   Department     Provider  --------------------------------------------------------------------------------                                EP -                              PRIMARY      [facility]  Last Visit: 11-      [facility]   MEDICINE II    Demi DAMIEN  Silas  Next Visit: None Scheduled  None         None Found                                                            Last  Test          Frequency    Reason                     Performed    Due Date  --------------------------------------------------------------------------------    Office Visit  12 months..  atorvastatin.............  11- 11-    Lipid Panel.  12 months..  atorvastatin.............  Not Found    Overdue    Powered by [employer]. Reference number: [id number].   3/04/2022 2:55:49 PM CST

## 2022-03-05 RX ORDER — ATORVASTATIN CALCIUM 80 MG/1
TABLET, FILM COATED ORAL
Qty: 90 TABLET | Refills: 0 | Status: SHIPPED | OUTPATIENT
Start: 2022-03-05 | End: 2022-06-22

## 2022-04-21 ENCOUNTER — OFFICE VISIT (OUTPATIENT)
Dept: INTERNAL MEDICINE | Facility: CLINIC | Age: 67
End: 2022-04-21
Payer: MEDICARE

## 2022-04-21 VITALS
OXYGEN SATURATION: 97 % | RESPIRATION RATE: 16 BRPM | WEIGHT: 258.19 LBS | HEART RATE: 77 BPM | DIASTOLIC BLOOD PRESSURE: 72 MMHG | HEIGHT: 76 IN | BODY MASS INDEX: 31.44 KG/M2 | SYSTOLIC BLOOD PRESSURE: 130 MMHG

## 2022-04-21 DIAGNOSIS — I10 ESSENTIAL HYPERTENSION: ICD-10-CM

## 2022-04-21 DIAGNOSIS — N50.89 TESTICULAR SWELLING, LEFT: Primary | ICD-10-CM

## 2022-04-21 DIAGNOSIS — E66.9 OBESITY (BMI 30.0-34.9): ICD-10-CM

## 2022-04-21 DIAGNOSIS — G47.33 OSA (OBSTRUCTIVE SLEEP APNEA): ICD-10-CM

## 2022-04-21 PROCEDURE — 99213 OFFICE O/P EST LOW 20 MIN: CPT | Mod: S$GLB,,, | Performed by: NURSE PRACTITIONER

## 2022-04-21 PROCEDURE — 99213 PR OFFICE/OUTPT VISIT, EST, LEVL III, 20-29 MIN: ICD-10-PCS | Mod: S$GLB,,, | Performed by: NURSE PRACTITIONER

## 2022-04-21 PROCEDURE — 1125F PR PAIN SEVERITY QUANTIFIED, PAIN PRESENT: ICD-10-PCS | Mod: CPTII,S$GLB,, | Performed by: NURSE PRACTITIONER

## 2022-04-21 PROCEDURE — 1125F AMNT PAIN NOTED PAIN PRSNT: CPT | Mod: CPTII,S$GLB,, | Performed by: NURSE PRACTITIONER

## 2022-04-21 PROCEDURE — 1159F MED LIST DOCD IN RCRD: CPT | Mod: CPTII,S$GLB,, | Performed by: NURSE PRACTITIONER

## 2022-04-21 PROCEDURE — 3078F PR MOST RECENT DIASTOLIC BLOOD PRESSURE < 80 MM HG: ICD-10-PCS | Mod: CPTII,S$GLB,, | Performed by: NURSE PRACTITIONER

## 2022-04-21 PROCEDURE — 3288F FALL RISK ASSESSMENT DOCD: CPT | Mod: CPTII,S$GLB,, | Performed by: NURSE PRACTITIONER

## 2022-04-21 PROCEDURE — 3075F PR MOST RECENT SYSTOLIC BLOOD PRESS GE 130-139MM HG: ICD-10-PCS | Mod: CPTII,S$GLB,, | Performed by: NURSE PRACTITIONER

## 2022-04-21 PROCEDURE — 4010F PR ACE/ARB THEARPY RXD/TAKEN: ICD-10-PCS | Mod: CPTII,S$GLB,, | Performed by: NURSE PRACTITIONER

## 2022-04-21 PROCEDURE — 3008F PR BODY MASS INDEX (BMI) DOCUMENTED: ICD-10-PCS | Mod: CPTII,S$GLB,, | Performed by: NURSE PRACTITIONER

## 2022-04-21 PROCEDURE — 4010F ACE/ARB THERAPY RXD/TAKEN: CPT | Mod: CPTII,S$GLB,, | Performed by: NURSE PRACTITIONER

## 2022-04-21 PROCEDURE — 1159F PR MEDICATION LIST DOCUMENTED IN MEDICAL RECORD: ICD-10-PCS | Mod: CPTII,S$GLB,, | Performed by: NURSE PRACTITIONER

## 2022-04-21 PROCEDURE — 3288F PR FALLS RISK ASSESSMENT DOCUMENTED: ICD-10-PCS | Mod: CPTII,S$GLB,, | Performed by: NURSE PRACTITIONER

## 2022-04-21 PROCEDURE — 1101F PR PT FALLS ASSESS DOC 0-1 FALLS W/OUT INJ PAST YR: ICD-10-PCS | Mod: CPTII,S$GLB,, | Performed by: NURSE PRACTITIONER

## 2022-04-21 PROCEDURE — 3075F SYST BP GE 130 - 139MM HG: CPT | Mod: CPTII,S$GLB,, | Performed by: NURSE PRACTITIONER

## 2022-04-21 PROCEDURE — 1101F PT FALLS ASSESS-DOCD LE1/YR: CPT | Mod: CPTII,S$GLB,, | Performed by: NURSE PRACTITIONER

## 2022-04-21 PROCEDURE — 3008F BODY MASS INDEX DOCD: CPT | Mod: CPTII,S$GLB,, | Performed by: NURSE PRACTITIONER

## 2022-04-21 PROCEDURE — 3078F DIAST BP <80 MM HG: CPT | Mod: CPTII,S$GLB,, | Performed by: NURSE PRACTITIONER

## 2022-04-21 PROCEDURE — 99999 PR PBB SHADOW E&M-EST. PATIENT-LVL V: ICD-10-PCS | Mod: PBBFAC,,, | Performed by: NURSE PRACTITIONER

## 2022-04-21 PROCEDURE — 99999 PR PBB SHADOW E&M-EST. PATIENT-LVL V: CPT | Mod: PBBFAC,,, | Performed by: NURSE PRACTITIONER

## 2022-04-21 RX ORDER — DOXYCYCLINE 100 MG/1
100 CAPSULE ORAL 2 TIMES DAILY
Qty: 14 CAPSULE | Refills: 0 | Status: SHIPPED | OUTPATIENT
Start: 2022-04-21 | End: 2022-04-27

## 2022-04-21 NOTE — PROGRESS NOTES
Subjective:           Patient ID: Juan Young is a 66 y.o. male.    Chief Complaint: Groin Pain and Groin Swelling (1-2 years off and on. Starting to hurt him. )    Known to me from acute visit, Known hx of CAD s/p PCI ,  LHC last  March and told all stents patent   Hx of  dizziness since he is 20.   Here with c/o left testicular swelling; causing left testicle ; denies trauma, fever. No redness or significant tenderness  Notes has happened before and resolves on its own.  Has noted a hard area at the base of scrotum in the past; no recent mass   deneis difficulty urinating       Review of Systems   Constitutional: Negative.  Negative for fever.   HENT: Negative for congestion, ear pain, sinus pressure, sneezing and sore throat.    Eyes: Negative.    Respiratory: Negative for cough, chest tightness, shortness of breath and wheezing.    Cardiovascular: Negative.  Negative for chest pain.   Gastrointestinal: Negative for abdominal pain, blood in stool, constipation, diarrhea, nausea and vomiting.   Genitourinary: Positive for scrotal swelling. Negative for decreased urine volume, difficulty urinating, dysuria, flank pain and urgency.   Musculoskeletal: Negative.  Negative for joint swelling.   Skin: Negative.    Neurological: Negative for dizziness, weakness and headaches.   Hematological: Negative.    Psychiatric/Behavioral: Negative.  Negative for behavioral problems and confusion.       Objective:      Physical Exam  Vitals and nursing note reviewed.   Constitutional:       General: He is not in acute distress.     Appearance: He is well-developed. He is not diaphoretic.   HENT:      Head: Normocephalic and atraumatic.      Right Ear: External ear normal.      Left Ear: External ear normal.      Nose: Nose normal.      Mouth/Throat:      Pharynx: No oropharyngeal exudate.   Eyes:      General:         Right eye: No discharge.         Left eye: No discharge.      Conjunctiva/sclera: Conjunctivae normal.       Pupils: Pupils are equal, round, and reactive to light.   Neck:      Thyroid: No thyromegaly.      Vascular: No JVD.      Trachea: No tracheal deviation.   Cardiovascular:      Rate and Rhythm: Normal rate and regular rhythm.      Heart sounds: Normal heart sounds. No murmur heard.    No friction rub. No gallop.   Pulmonary:      Effort: Pulmonary effort is normal. No respiratory distress.      Breath sounds: Normal breath sounds. No stridor. No wheezing or rales.   Chest:      Chest wall: No tenderness.   Abdominal:      General: Bowel sounds are normal. There is no distension.      Palpations: Abdomen is soft. There is no mass.      Tenderness: There is no abdominal tenderness. There is no right CVA tenderness, left CVA tenderness, guarding or rebound.      Hernia: No hernia is present.   Genitourinary:     Comments: Left testicular swelling; no mass palpated,   No erythema, no significant tenderness   Musculoskeletal:         General: No tenderness. Normal range of motion.      Cervical back: Normal range of motion and neck supple.   Lymphadenopathy:      Cervical: No cervical adenopathy.   Skin:     General: Skin is warm and dry.      Capillary Refill: Capillary refill takes less than 2 seconds.      Coloration: Skin is not pale.      Findings: No erythema or rash.   Neurological:      General: No focal deficit present.      Mental Status: He is alert and oriented to person, place, and time.      Cranial Nerves: No cranial nerve deficit.      Motor: No abnormal muscle tone.      Coordination: Coordination normal.      Deep Tendon Reflexes: Reflexes are normal and symmetric. Reflexes normal.   Psychiatric:         Mood and Affect: Mood normal.         Behavior: Behavior normal.         Thought Content: Thought content normal.         Judgment: Judgment normal.      Comments: Negative sihi noted         Assessment:       1. Testicular swelling, left    2. Essential hypertension    3. Obesity (BMI 30.0-34.9)    4.  RENEE (obstructive sleep apnea)        Plan:   Juan was seen today for groin pain and groin swelling.    Diagnoses and all orders for this visit:    Testicular swelling, left  -     Ambulatory referral/consult to Urology; Future  -     doxycycline (MONODOX) 100 MG capsule; Take 1 capsule (100 mg total) by mouth 2 (two) times daily.  -     US Scrotum And Testicles; Future    Essential hypertension    Obesity (BMI 30.0-34.9)    RENEE (obstructive sleep apnea)      Problem List Items Addressed This Visit        Endocrine    Obesity (BMI 30.0-34.9)      Other Visit Diagnoses     Testicular swelling, left    -  Primary    Relevant Medications    doxycycline (MONODOX) 100 MG capsule    Other Relevant Orders    Ambulatory referral/consult to Urology    US Scrotum And Testicles    Essential hypertension        RENEE (obstructive sleep apnea)            DDX ; epididymitis, hydrocele, varicocele   US ASAP  Referral to urology  Trial of doxy

## 2022-04-27 ENCOUNTER — OFFICE VISIT (OUTPATIENT)
Dept: INTERNAL MEDICINE | Facility: CLINIC | Age: 67
End: 2022-04-27
Payer: MEDICARE

## 2022-04-27 VITALS
WEIGHT: 259.69 LBS | OXYGEN SATURATION: 96 % | HEART RATE: 76 BPM | SYSTOLIC BLOOD PRESSURE: 106 MMHG | HEIGHT: 76 IN | RESPIRATION RATE: 20 BRPM | DIASTOLIC BLOOD PRESSURE: 60 MMHG | BODY MASS INDEX: 31.62 KG/M2

## 2022-04-27 DIAGNOSIS — I25.10 CORONARY ARTERY DISEASE, UNSPECIFIED VESSEL OR LESION TYPE, UNSPECIFIED WHETHER ANGINA PRESENT, UNSPECIFIED WHETHER NATIVE OR TRANSPLANTED HEART: ICD-10-CM

## 2022-04-27 DIAGNOSIS — E66.9 OBESITY (BMI 30.0-34.9): ICD-10-CM

## 2022-04-27 DIAGNOSIS — E78.5 HYPERLIPIDEMIA, UNSPECIFIED HYPERLIPIDEMIA TYPE: Chronic | ICD-10-CM

## 2022-04-27 DIAGNOSIS — Z23 IMMUNIZATION DUE: ICD-10-CM

## 2022-04-27 DIAGNOSIS — N50.89 SCROTAL SWELLING: ICD-10-CM

## 2022-04-27 DIAGNOSIS — I10 PRIMARY HYPERTENSION: Primary | ICD-10-CM

## 2022-04-27 PROCEDURE — 99499 RISK ADDL DX/OHS AUDIT: ICD-10-PCS | Mod: S$GLB,,, | Performed by: INTERNAL MEDICINE

## 2022-04-27 PROCEDURE — G0009 PNEUMOCOCCAL CONJUGATE VACCINE 20-VALENT: ICD-10-PCS | Mod: S$GLB,,, | Performed by: INTERNAL MEDICINE

## 2022-04-27 PROCEDURE — 99999 PR PBB SHADOW E&M-EST. PATIENT-LVL IV: ICD-10-PCS | Mod: PBBFAC,,, | Performed by: INTERNAL MEDICINE

## 2022-04-27 PROCEDURE — 99999 PR PBB SHADOW E&M-EST. PATIENT-LVL IV: CPT | Mod: PBBFAC,,, | Performed by: INTERNAL MEDICINE

## 2022-04-27 PROCEDURE — 99499 UNLISTED E&M SERVICE: CPT | Mod: S$GLB,,, | Performed by: INTERNAL MEDICINE

## 2022-04-27 PROCEDURE — 99214 OFFICE O/P EST MOD 30 MIN: CPT | Mod: S$GLB,,, | Performed by: INTERNAL MEDICINE

## 2022-04-27 PROCEDURE — 99214 PR OFFICE/OUTPT VISIT, EST, LEVL IV, 30-39 MIN: ICD-10-PCS | Mod: S$GLB,,, | Performed by: INTERNAL MEDICINE

## 2022-04-27 PROCEDURE — 90677 PCV20 VACCINE IM: CPT | Mod: S$GLB,,, | Performed by: INTERNAL MEDICINE

## 2022-04-27 PROCEDURE — G0009 ADMIN PNEUMOCOCCAL VACCINE: HCPCS | Mod: S$GLB,,, | Performed by: INTERNAL MEDICINE

## 2022-04-27 PROCEDURE — 90677 PNEUMOCOCCAL CONJUGATE VACCINE 20-VALENT: ICD-10-PCS | Mod: S$GLB,,, | Performed by: INTERNAL MEDICINE

## 2022-04-27 NOTE — PROGRESS NOTES
Subjective:       Patient ID: Juan Young is a 66 y.o. male.    Chief Complaint: Follow-up, Hyperlipidemia, and Hypertension    Juan Young is a 66 y.o. male who presents for CAD ,  Hypertension, and Hyperlipidemia follow up. Labs were reviewed with patient today.      Review of Systems   Constitutional: Negative for chills and fever.   HENT: Negative for congestion, postnasal drip and sore throat.    Eyes: Negative for photophobia.   Respiratory: Negative for chest tightness and shortness of breath.    Cardiovascular: Negative for chest pain.   Gastrointestinal: Negative for abdominal distention, abdominal pain, blood in stool and vomiting.   Genitourinary: Positive for scrotal swelling. Negative for dysuria, flank pain and hematuria.   Musculoskeletal: Negative for back pain.   Skin: Negative for pallor.   Neurological: Negative for dizziness, seizures, facial asymmetry, speech difficulty and numbness.   Hematological: Does not bruise/bleed easily.   Psychiatric/Behavioral: Negative for agitation and suicidal ideas. The patient is not nervous/anxious.        Objective:      Physical Exam  Vitals and nursing note reviewed.   Constitutional:       Appearance: He is well-developed.   HENT:      Head: Normocephalic and atraumatic.      Nose: Nose normal.   Eyes:      Conjunctiva/sclera: Conjunctivae normal.      Pupils: Pupils are equal, round, and reactive to light.   Neck:      Thyroid: No thyromegaly.      Vascular: No JVD.   Cardiovascular:      Rate and Rhythm: Normal rate and regular rhythm.      Heart sounds: Normal heart sounds.   Pulmonary:      Effort: Pulmonary effort is normal.      Breath sounds: Normal breath sounds.   Abdominal:      General: Bowel sounds are normal. There is no distension.      Palpations: Abdomen is soft. There is no mass.      Tenderness: There is no abdominal tenderness. There is no guarding.   Genitourinary:     Comments: Left testicular swelling   Musculoskeletal:          General: Normal range of motion.      Cervical back: Normal range of motion and neck supple.   Lymphadenopathy:      Cervical: No cervical adenopathy.   Skin:     General: Skin is warm and dry.      Coloration: Skin is not pale.      Findings: No rash.   Neurological:      Mental Status: He is alert and oriented to person, place, and time.      Cranial Nerves: No cranial nerve deficit.      Deep Tendon Reflexes: Reflexes are normal and symmetric.         Assessment:       1. Primary hypertension    2. Immunization due    3. Hyperlipidemia, unspecified hyperlipidemia type    4. Obesity (BMI 30.0-34.9)    5. Coronary artery disease, unspecified vessel or lesion type, unspecified whether angina present, unspecified whether native or transplanted heart    6. Scrotal swelling        Plan:   Juan was seen today for follow-up, hyperlipidemia and hypertension.    Diagnoses and all orders for this visit:    Primary hypertension  -     CBC Auto Differential; Future  -     Comprehensive Metabolic Panel; Future    Well controlled.  Continue same medication and dose.  1. Keep weight close to ideal body weight.   2.   Avoid high salt foods (olives, pickles, smoked meats, salted potato chips, etc.).   Do not add salt to your food at the table.   Use only small amounts of salt when cooking.   3. Begin an exercise program. Discuss with your doctor what type of exercise program would be best for you. It doesn't have to be difficult. Even brisk walking for 20 minutes three times a week is a good form of exercise.   4. Avoid medicines which contain heart stimulants. This includes many cold and sinus decongestant pills and sprays as well as diet pills. Check the warnings about hypertension on the label. Stimulants such as amphetamine or cocaine could be lethal for someone with hypertension. Never take these.    Immunization due  -     Pneumococcal Conjugate Vaccine (20 Valent) (IM)    Hyperlipidemia, unspecified hyperlipidemia type  -      Lipid Panel; Future  -     TSH; Future  Will get labs from [facility]     Obesity (BMI 30.0-34.9)  -     TSH; Future    # The patient is asked to make an attempt to improve diet and exercise patterns to aid in medical management of this problem.     # Eat  5 small meals a day.     # Cut out high carbohydrate  foods : bread, rice, pasta, potatoes.     # Exercise/walk 5x/week for at least 30-45  minutes.        Coronary artery disease, unspecified vessel or lesion type, unspecified whether angina present, unspecified whether native or transplanted heart  -     Lipid Panel; Future  Continue daily ASA       Scrotal swelling  -     US Scrotum And Testicles; Future      Problem List Items Addressed This Visit     Hyperlipidemia (Chronic)    Relevant Orders    Lipid Panel    TSH    HTN (hypertension)    Relevant Orders    CBC Auto Differential    Comprehensive Metabolic Panel    CAD (coronary artery disease)    Overview     3/23/2021 [facility]  1. Left ventricular end diastolic pressure was 22 mmHg.  2. Left ventricular function:   Moderate global hypokinesis  3. LM:   Normal.  4. LAD:  Ostial 20%.  Proximal stent widely patent.  Mid vessel 60-70% lesion.  5. CFx:  Proximal 30% lesion.  Mid vessel stent widely patent.  6. RCA:  Ostial 40%.  Mid vessel stent widely patent.  Mid vessel 30% lesion.  Distal stent widely patent with mild distal disease           Relevant Orders    Lipid Panel    Obesity (BMI 30.0-34.9)    Relevant Orders    TSH      Other Visit Diagnoses     Immunization due    -  Primary    Relevant Orders    Pneumococcal Conjugate Vaccine (20 Valent) (IM)

## 2022-05-04 DIAGNOSIS — Z12.11 COLON CANCER SCREENING: ICD-10-CM

## 2022-05-10 ENCOUNTER — TELEPHONE (OUTPATIENT)
Dept: INTERNAL MEDICINE | Facility: CLINIC | Age: 67
End: 2022-05-10
Payer: MEDICARE

## 2022-05-10 DIAGNOSIS — N43.3 HYDROCELE IN ADULT: Primary | ICD-10-CM

## 2022-05-10 NOTE — TELEPHONE ENCOUNTER
NP :ultrasound shows a fluid filled cyst like structure called hydrocele on left side ; needs to see urology   Referral place d;    Report :     Large left hydrocele of uncertain etiology     Otherwise, unremarkable exam

## 2022-05-11 ENCOUNTER — TELEPHONE (OUTPATIENT)
Dept: INTERNAL MEDICINE | Facility: CLINIC | Age: 67
End: 2022-05-11
Payer: MEDICARE

## 2022-06-23 ENCOUNTER — TELEPHONE (OUTPATIENT)
Dept: INTERNAL MEDICINE | Facility: CLINIC | Age: 67
End: 2022-06-23
Payer: MEDICARE

## 2022-06-23 NOTE — TELEPHONE ENCOUNTER
Informed pt that referral was sent to Dr. Montague on 5/11/2022. Informed him I will resend referral information but that he can call them and schedule an appointment.

## 2022-06-23 NOTE — TELEPHONE ENCOUNTER
----- Message from Chapis Aguilera sent at 2022 12:01 PM CDT -----  Contact: Juan Young  MRN: [id number]  : 1955  PCP: Soren Pineda  Home Phone      [phone]  Work Phone      Not on file.  Mobile          [phone]      MESSAGE: Juan called to follow up with you because he has not heard from the Urologist about an appointment. He said its been over 3 weeks and he needs to get this taken care of.  He said his brother saw Dr. Kem Montague, [phone]. He would like for you to refer him there. I explained that the hold up could possibly be approval from his insurance.         [phone]

## 2022-07-15 ENCOUNTER — OFFICE VISIT (OUTPATIENT)
Dept: FAMILY MEDICINE | Facility: CLINIC | Age: 67
End: 2022-07-15
Payer: MEDICARE

## 2022-07-15 ENCOUNTER — CLINICAL SUPPORT (OUTPATIENT)
Dept: FAMILY MEDICINE | Facility: CLINIC | Age: 67
End: 2022-07-15
Payer: MEDICARE

## 2022-07-15 VITALS
RESPIRATION RATE: 20 BRPM | SYSTOLIC BLOOD PRESSURE: 110 MMHG | DIASTOLIC BLOOD PRESSURE: 72 MMHG | HEART RATE: 72 BPM | WEIGHT: 253 LBS | HEIGHT: 76 IN | BODY MASS INDEX: 30.81 KG/M2

## 2022-07-15 DIAGNOSIS — J02.9 SORE THROAT: Primary | ICD-10-CM

## 2022-07-15 DIAGNOSIS — R05.9 COUGH: ICD-10-CM

## 2022-07-15 DIAGNOSIS — J02.8 PHARYNGITIS DUE TO OTHER ORGANISM: ICD-10-CM

## 2022-07-15 LAB
CTP QC/QA: YES
SARS-COV-2 RDRP RESP QL NAA+PROBE: NEGATIVE

## 2022-07-15 PROCEDURE — 1160F PR REVIEW ALL MEDS BY PRESCRIBER/CLIN PHARMACIST DOCUMENTED: ICD-10-PCS | Mod: CPTII,S$GLB,, | Performed by: NURSE PRACTITIONER

## 2022-07-15 PROCEDURE — 3078F DIAST BP <80 MM HG: CPT | Mod: CPTII,S$GLB,, | Performed by: NURSE PRACTITIONER

## 2022-07-15 PROCEDURE — 1126F PR PAIN SEVERITY QUANTIFIED, NO PAIN PRESENT: ICD-10-PCS | Mod: CPTII,S$GLB,, | Performed by: NURSE PRACTITIONER

## 2022-07-15 PROCEDURE — 99213 PR OFFICE/OUTPT VISIT, EST, LEVL III, 20-29 MIN: ICD-10-PCS | Mod: 25,S$GLB,, | Performed by: NURSE PRACTITIONER

## 2022-07-15 PROCEDURE — 4010F PR ACE/ARB THEARPY RXD/TAKEN: ICD-10-PCS | Mod: CPTII,S$GLB,, | Performed by: NURSE PRACTITIONER

## 2022-07-15 PROCEDURE — 96372 THER/PROPH/DIAG INJ SC/IM: CPT | Mod: S$GLB,,, | Performed by: NURSE PRACTITIONER

## 2022-07-15 PROCEDURE — 1160F RVW MEDS BY RX/DR IN RCRD: CPT | Mod: CPTII,S$GLB,, | Performed by: NURSE PRACTITIONER

## 2022-07-15 PROCEDURE — 1126F AMNT PAIN NOTED NONE PRSNT: CPT | Mod: CPTII,S$GLB,, | Performed by: NURSE PRACTITIONER

## 2022-07-15 PROCEDURE — 3288F PR FALLS RISK ASSESSMENT DOCUMENTED: ICD-10-PCS | Mod: CPTII,S$GLB,, | Performed by: NURSE PRACTITIONER

## 2022-07-15 PROCEDURE — 3074F SYST BP LT 130 MM HG: CPT | Mod: CPTII,S$GLB,, | Performed by: NURSE PRACTITIONER

## 2022-07-15 PROCEDURE — 1159F PR MEDICATION LIST DOCUMENTED IN MEDICAL RECORD: ICD-10-PCS | Mod: CPTII,S$GLB,, | Performed by: NURSE PRACTITIONER

## 2022-07-15 PROCEDURE — 3288F FALL RISK ASSESSMENT DOCD: CPT | Mod: CPTII,S$GLB,, | Performed by: NURSE PRACTITIONER

## 2022-07-15 PROCEDURE — 1101F PT FALLS ASSESS-DOCD LE1/YR: CPT | Mod: CPTII,S$GLB,, | Performed by: NURSE PRACTITIONER

## 2022-07-15 PROCEDURE — 99213 OFFICE O/P EST LOW 20 MIN: CPT | Mod: 25,S$GLB,, | Performed by: NURSE PRACTITIONER

## 2022-07-15 PROCEDURE — 4010F ACE/ARB THERAPY RXD/TAKEN: CPT | Mod: CPTII,S$GLB,, | Performed by: NURSE PRACTITIONER

## 2022-07-15 PROCEDURE — 3078F PR MOST RECENT DIASTOLIC BLOOD PRESSURE < 80 MM HG: ICD-10-PCS | Mod: CPTII,S$GLB,, | Performed by: NURSE PRACTITIONER

## 2022-07-15 PROCEDURE — U0002: ICD-10-PCS | Mod: QW,S$GLB,, | Performed by: NURSE PRACTITIONER

## 2022-07-15 PROCEDURE — 1101F PR PT FALLS ASSESS DOC 0-1 FALLS W/OUT INJ PAST YR: ICD-10-PCS | Mod: CPTII,S$GLB,, | Performed by: NURSE PRACTITIONER

## 2022-07-15 PROCEDURE — U0002 COVID-19 LAB TEST NON-CDC: HCPCS | Mod: QW,S$GLB,, | Performed by: NURSE PRACTITIONER

## 2022-07-15 PROCEDURE — 99999 PR PBB SHADOW E&M-EST. PATIENT-LVL III: CPT | Mod: PBBFAC,,, | Performed by: NURSE PRACTITIONER

## 2022-07-15 PROCEDURE — 3008F BODY MASS INDEX DOCD: CPT | Mod: CPTII,S$GLB,, | Performed by: NURSE PRACTITIONER

## 2022-07-15 PROCEDURE — 3008F PR BODY MASS INDEX (BMI) DOCUMENTED: ICD-10-PCS | Mod: CPTII,S$GLB,, | Performed by: NURSE PRACTITIONER

## 2022-07-15 PROCEDURE — 3074F PR MOST RECENT SYSTOLIC BLOOD PRESSURE < 130 MM HG: ICD-10-PCS | Mod: CPTII,S$GLB,, | Performed by: NURSE PRACTITIONER

## 2022-07-15 PROCEDURE — 96372 PR INJECTION,THERAP/PROPH/DIAG2ST, IM OR SUBCUT: ICD-10-PCS | Mod: S$GLB,,, | Performed by: NURSE PRACTITIONER

## 2022-07-15 PROCEDURE — 1159F MED LIST DOCD IN RCRD: CPT | Mod: CPTII,S$GLB,, | Performed by: NURSE PRACTITIONER

## 2022-07-15 PROCEDURE — 99999 PR PBB SHADOW E&M-EST. PATIENT-LVL III: ICD-10-PCS | Mod: PBBFAC,,, | Performed by: NURSE PRACTITIONER

## 2022-07-15 RX ORDER — AMOXICILLIN 875 MG/1
875 TABLET, FILM COATED ORAL EVERY 12 HOURS
Qty: 20 TABLET | Refills: 0 | Status: SHIPPED | OUTPATIENT
Start: 2022-07-15 | End: 2022-07-21

## 2022-07-15 RX ORDER — TRIAMCINOLONE ACETONIDE 40 MG/ML
60 INJECTION, SUSPENSION INTRA-ARTICULAR; INTRAMUSCULAR
Status: COMPLETED | OUTPATIENT
Start: 2022-07-15 | End: 2022-07-15

## 2022-07-15 RX ORDER — PROMETHAZINE HYDROCHLORIDE AND DEXTROMETHORPHAN HYDROBROMIDE 6.25; 15 MG/5ML; MG/5ML
5 SYRUP ORAL NIGHTLY PRN
Qty: 120 ML | Refills: 0 | Status: SHIPPED | OUTPATIENT
Start: 2022-07-15 | End: 2022-07-28

## 2022-07-15 RX ADMIN — TRIAMCINOLONE ACETONIDE 60 MG: 40 INJECTION, SUSPENSION INTRA-ARTICULAR; INTRAMUSCULAR at 02:07

## 2022-07-15 NOTE — PROGRESS NOTES
Subjective:       Patient ID: Juan Young is a 66 y.o. male.    Chief Complaint: Sore Throat (Patient states he started with an itchy throat about 4-5 days ago) and Cough    Here with itchy throat for 1 week. Coughing a little.    Sore Throat   The problem has been unchanged. There has been no fever. Associated symptoms include coughing. Pertinent negatives include no abdominal pain, congestion, diarrhea, ear pain, headaches, shortness of breath or vomiting.   Cough  The current episode started 1 to 4 weeks ago. The problem has been waxing and waning. The cough is non-productive. Associated symptoms include a sore throat. Pertinent negatives include no chest pain, ear pain, fever, headaches, rash, shortness of breath or wheezing.     Review of Systems   Constitutional: Negative.  Negative for appetite change, fatigue and fever.   HENT: Positive for sore throat. Negative for congestion and ear pain.    Eyes: Negative.  Negative for visual disturbance.   Respiratory: Positive for cough. Negative for shortness of breath and wheezing.    Cardiovascular: Negative.  Negative for chest pain and palpitations.   Gastrointestinal: Negative.  Negative for abdominal pain, diarrhea, nausea and vomiting.   Genitourinary: Negative.  Negative for difficulty urinating.   Musculoskeletal: Negative.    Skin: Negative.  Negative for rash.   Neurological: Negative.  Negative for headaches.   Psychiatric/Behavioral: Negative.  Negative for sleep disturbance. The patient is not nervous/anxious.    All other systems reviewed and are negative.      Objective:      Physical Exam  Vitals and nursing note reviewed.   Constitutional:       General: He is not in acute distress.     Appearance: Normal appearance. He is well-developed.   HENT:      Head: Normocephalic and atraumatic.      Right Ear: Tympanic membrane and external ear normal.      Left Ear: Tympanic membrane and external ear normal.      Nose: Nose normal.      Mouth/Throat:       Pharynx: Posterior oropharyngeal erythema present.   Eyes:      Conjunctiva/sclera: Conjunctivae normal.      Pupils: Pupils are equal, round, and reactive to light.   Cardiovascular:      Rate and Rhythm: Normal rate and regular rhythm.      Pulses: Normal pulses.      Heart sounds: Normal heart sounds. No murmur heard.  Pulmonary:      Effort: Pulmonary effort is normal. No respiratory distress.      Breath sounds: Normal breath sounds.   Abdominal:      Palpations: Abdomen is soft.   Musculoskeletal:         General: Normal range of motion.      Cervical back: Normal range of motion and neck supple.   Lymphadenopathy:      Cervical: No cervical adenopathy.   Skin:     General: Skin is warm and dry.   Neurological:      Mental Status: He is alert and oriented to person, place, and time.   Psychiatric:         Mood and Affect: Mood normal.         Assessment:       1. Sore throat    2. Pharyngitis due to other organism    3. Cough        Plan:   Juan was seen today for sore throat and cough.    Diagnoses and all orders for this visit:    Sore throat  -     POCT COVID-19 Rapid Screening - negative    Pharyngitis due to other organism  -     triamcinolone acetonide injection 60 mg  -     amoxicillin (AMOXIL) 875 MG tablet; Take 1 tablet (875 mg total) by mouth every 12 (twelve) hours. for 10 days    Cough  -     promethazine-dextromethorphan (PROMETHAZINE-DM) 6.25-15 mg/5 mL Syrp; Take 5 mLs by mouth nightly as needed (cough).    RTC PRN     no trauma, no weakness, no numbness

## 2022-07-19 ENCOUNTER — OFFICE VISIT (OUTPATIENT)
Dept: INTERNAL MEDICINE | Facility: CLINIC | Age: 67
End: 2022-07-19
Payer: MEDICARE

## 2022-07-19 VITALS
DIASTOLIC BLOOD PRESSURE: 72 MMHG | RESPIRATION RATE: 19 BRPM | HEIGHT: 76 IN | BODY MASS INDEX: 30.55 KG/M2 | WEIGHT: 250.88 LBS | HEART RATE: 72 BPM | SYSTOLIC BLOOD PRESSURE: 126 MMHG | OXYGEN SATURATION: 94 %

## 2022-07-19 DIAGNOSIS — R42 DIZZINESS: Primary | ICD-10-CM

## 2022-07-19 PROCEDURE — 3288F PR FALLS RISK ASSESSMENT DOCUMENTED: ICD-10-PCS | Mod: CPTII,S$GLB,, | Performed by: INTERNAL MEDICINE

## 2022-07-19 PROCEDURE — 99213 PR OFFICE/OUTPT VISIT, EST, LEVL III, 20-29 MIN: ICD-10-PCS | Mod: S$GLB,,, | Performed by: INTERNAL MEDICINE

## 2022-07-19 PROCEDURE — 1126F AMNT PAIN NOTED NONE PRSNT: CPT | Mod: CPTII,S$GLB,, | Performed by: INTERNAL MEDICINE

## 2022-07-19 PROCEDURE — 3078F DIAST BP <80 MM HG: CPT | Mod: CPTII,S$GLB,, | Performed by: INTERNAL MEDICINE

## 2022-07-19 PROCEDURE — 4010F PR ACE/ARB THEARPY RXD/TAKEN: ICD-10-PCS | Mod: CPTII,S$GLB,, | Performed by: INTERNAL MEDICINE

## 2022-07-19 PROCEDURE — 1159F MED LIST DOCD IN RCRD: CPT | Mod: CPTII,S$GLB,, | Performed by: INTERNAL MEDICINE

## 2022-07-19 PROCEDURE — 3008F PR BODY MASS INDEX (BMI) DOCUMENTED: ICD-10-PCS | Mod: CPTII,S$GLB,, | Performed by: INTERNAL MEDICINE

## 2022-07-19 PROCEDURE — 3078F PR MOST RECENT DIASTOLIC BLOOD PRESSURE < 80 MM HG: ICD-10-PCS | Mod: CPTII,S$GLB,, | Performed by: INTERNAL MEDICINE

## 2022-07-19 PROCEDURE — 1160F PR REVIEW ALL MEDS BY PRESCRIBER/CLIN PHARMACIST DOCUMENTED: ICD-10-PCS | Mod: CPTII,S$GLB,, | Performed by: INTERNAL MEDICINE

## 2022-07-19 PROCEDURE — 99999 PR PBB SHADOW E&M-EST. PATIENT-LVL IV: CPT | Mod: PBBFAC,,, | Performed by: INTERNAL MEDICINE

## 2022-07-19 PROCEDURE — 1160F RVW MEDS BY RX/DR IN RCRD: CPT | Mod: CPTII,S$GLB,, | Performed by: INTERNAL MEDICINE

## 2022-07-19 PROCEDURE — 3074F SYST BP LT 130 MM HG: CPT | Mod: CPTII,S$GLB,, | Performed by: INTERNAL MEDICINE

## 2022-07-19 PROCEDURE — 99213 OFFICE O/P EST LOW 20 MIN: CPT | Mod: S$GLB,,, | Performed by: INTERNAL MEDICINE

## 2022-07-19 PROCEDURE — 1126F PR PAIN SEVERITY QUANTIFIED, NO PAIN PRESENT: ICD-10-PCS | Mod: CPTII,S$GLB,, | Performed by: INTERNAL MEDICINE

## 2022-07-19 PROCEDURE — 3288F FALL RISK ASSESSMENT DOCD: CPT | Mod: CPTII,S$GLB,, | Performed by: INTERNAL MEDICINE

## 2022-07-19 PROCEDURE — 3008F BODY MASS INDEX DOCD: CPT | Mod: CPTII,S$GLB,, | Performed by: INTERNAL MEDICINE

## 2022-07-19 PROCEDURE — 1159F PR MEDICATION LIST DOCUMENTED IN MEDICAL RECORD: ICD-10-PCS | Mod: CPTII,S$GLB,, | Performed by: INTERNAL MEDICINE

## 2022-07-19 PROCEDURE — 1101F PR PT FALLS ASSESS DOC 0-1 FALLS W/OUT INJ PAST YR: ICD-10-PCS | Mod: CPTII,S$GLB,, | Performed by: INTERNAL MEDICINE

## 2022-07-19 PROCEDURE — 4010F ACE/ARB THERAPY RXD/TAKEN: CPT | Mod: CPTII,S$GLB,, | Performed by: INTERNAL MEDICINE

## 2022-07-19 PROCEDURE — 99999 PR PBB SHADOW E&M-EST. PATIENT-LVL IV: ICD-10-PCS | Mod: PBBFAC,,, | Performed by: INTERNAL MEDICINE

## 2022-07-19 PROCEDURE — 3074F PR MOST RECENT SYSTOLIC BLOOD PRESSURE < 130 MM HG: ICD-10-PCS | Mod: CPTII,S$GLB,, | Performed by: INTERNAL MEDICINE

## 2022-07-19 PROCEDURE — 1101F PT FALLS ASSESS-DOCD LE1/YR: CPT | Mod: CPTII,S$GLB,, | Performed by: INTERNAL MEDICINE

## 2022-07-19 NOTE — PROGRESS NOTES
Subjective:       Patient ID: Juan Young is a 66 y.o. male.    Chief Complaint: Dizziness    HPI     66 year old male presents complaining of chronic dizziness that has worsened over the past week. Describes as room spinning. Getting up makes it worse. Dizziness is intermittent throughout the day. No nausea or vomiting. No ear pain or hearing loss. No falls. Patient states he does not take his metoprolol at night because he does not like to take pills. States his bloo pressure was 150 systolic this morning. Patient works outside a lot. He has been drinking/eating appropriately. Notes the dizziness improves some after taking his medicines and after eating. Last visit to optometrist was one year ago. Patient states he needs glasses but does not want to buy them.    Review of Systems   Constitutional: Negative for chills and fever.   HENT: Negative for congestion, postnasal drip and sore throat.    Eyes: Negative for photophobia.   Respiratory: Negative for chest tightness and shortness of breath.    Cardiovascular: Negative for chest pain.   Gastrointestinal: Negative for abdominal distention, abdominal pain, blood in stool and vomiting.   Genitourinary: Negative for dysuria, flank pain and hematuria.   Musculoskeletal: Negative for back pain.   Skin: Negative for pallor.   Neurological: Positive for dizziness. Negative for seizures, syncope, facial asymmetry, speech difficulty, weakness, numbness and headaches.   Hematological: Does not bruise/bleed easily.   Psychiatric/Behavioral: Negative for agitation and suicidal ideas. The patient is not nervous/anxious.        Objective:      Physical Exam  Vitals and nursing note reviewed.   Constitutional:       Appearance: He is well-developed.   HENT:      Head: Normocephalic and atraumatic.      Nose: Nose normal.   Eyes:      Conjunctiva/sclera: Conjunctivae normal.      Pupils: Pupils are equal, round, and reactive to light.   Neck:      Thyroid: No thyromegaly.       Vascular: No JVD.   Cardiovascular:      Rate and Rhythm: Normal rate and regular rhythm.      Heart sounds: Normal heart sounds.   Pulmonary:      Effort: Pulmonary effort is normal.      Breath sounds: Normal breath sounds.   Abdominal:      General: Bowel sounds are normal. There is no distension.      Palpations: Abdomen is soft. There is no mass.      Tenderness: There is no abdominal tenderness. There is no guarding.   Musculoskeletal:         General: Normal range of motion.      Cervical back: Normal range of motion and neck supple.   Lymphadenopathy:      Cervical: No cervical adenopathy.   Skin:     General: Skin is warm and dry.      Coloration: Skin is not pale.      Findings: No rash.   Neurological:      Mental Status: He is alert and oriented to person, place, and time.      Cranial Nerves: No cranial nerve deficit.      Deep Tendon Reflexes: Reflexes are normal and symmetric.         Assessment:       1. Dizziness        Plan:   Juan was seen today for dizziness.    Diagnoses and all orders for this visit:    Dizziness    stop aldactone for 1 week   He is working in very hot weather .looks dehydrated   Hydrate.gatorade  If not better will do labs    Problem List Items Addressed This Visit    None

## 2022-07-21 ENCOUNTER — LAB VISIT (OUTPATIENT)
Dept: LAB | Facility: HOSPITAL | Age: 67
End: 2022-07-21
Attending: NURSE PRACTITIONER
Payer: MEDICARE

## 2022-07-21 ENCOUNTER — OFFICE VISIT (OUTPATIENT)
Dept: INTERNAL MEDICINE | Facility: CLINIC | Age: 67
End: 2022-07-21
Payer: MEDICARE

## 2022-07-21 VITALS
DIASTOLIC BLOOD PRESSURE: 78 MMHG | SYSTOLIC BLOOD PRESSURE: 124 MMHG | HEART RATE: 76 BPM | RESPIRATION RATE: 16 BRPM | BODY MASS INDEX: 30.23 KG/M2 | WEIGHT: 248.25 LBS | OXYGEN SATURATION: 97 % | HEIGHT: 76 IN

## 2022-07-21 DIAGNOSIS — R53.83 FATIGUE, UNSPECIFIED TYPE: Primary | ICD-10-CM

## 2022-07-21 DIAGNOSIS — G47.33 OSA (OBSTRUCTIVE SLEEP APNEA): ICD-10-CM

## 2022-07-21 DIAGNOSIS — I10 PRIMARY HYPERTENSION: ICD-10-CM

## 2022-07-21 DIAGNOSIS — R53.83 FATIGUE, UNSPECIFIED TYPE: ICD-10-CM

## 2022-07-21 DIAGNOSIS — E66.9 OBESITY (BMI 30.0-34.9): ICD-10-CM

## 2022-07-21 DIAGNOSIS — D72.829 LEUKOCYTOSIS, UNSPECIFIED TYPE: Primary | ICD-10-CM

## 2022-07-21 LAB
ALBUMIN SERPL BCP-MCNC: 4.2 G/DL (ref 3.5–5.2)
ALP SERPL-CCNC: 108 U/L (ref 55–135)
ALT SERPL W/O P-5'-P-CCNC: 34 U/L (ref 10–44)
ANION GAP SERPL CALC-SCNC: 11 MMOL/L (ref 8–16)
AST SERPL-CCNC: 18 U/L (ref 10–40)
BASOPHILS # BLD AUTO: 0.12 K/UL (ref 0–0.2)
BASOPHILS NFR BLD: 0.8 % (ref 0–1.9)
BILIRUB SERPL-MCNC: 1 MG/DL (ref 0.1–1)
BUN SERPL-MCNC: 25 MG/DL (ref 8–23)
CALCIUM SERPL-MCNC: 10 MG/DL (ref 8.7–10.5)
CHLORIDE SERPL-SCNC: 103 MMOL/L (ref 95–110)
CO2 SERPL-SCNC: 27 MMOL/L (ref 23–29)
CREAT SERPL-MCNC: 0.9 MG/DL (ref 0.5–1.4)
DIFFERENTIAL METHOD: ABNORMAL
EOSINOPHIL # BLD AUTO: 0.1 K/UL (ref 0–0.5)
EOSINOPHIL NFR BLD: 1 % (ref 0–8)
ERYTHROCYTE [DISTWIDTH] IN BLOOD BY AUTOMATED COUNT: 13 % (ref 11.5–14.5)
EST. GFR  (AFRICAN AMERICAN): >60 ML/MIN/1.73 M^2
EST. GFR  (NON AFRICAN AMERICAN): >60 ML/MIN/1.73 M^2
GLUCOSE SERPL-MCNC: 108 MG/DL (ref 70–110)
HCT VFR BLD AUTO: 44.8 % (ref 40–54)
HGB BLD-MCNC: 15.4 G/DL (ref 14–18)
IMM GRANULOCYTES # BLD AUTO: 0.05 K/UL (ref 0–0.04)
IMM GRANULOCYTES NFR BLD AUTO: 0.3 % (ref 0–0.5)
LYMPHOCYTES # BLD AUTO: 2.2 K/UL (ref 1–4.8)
LYMPHOCYTES NFR BLD: 14.7 % (ref 18–48)
MCH RBC QN AUTO: 31.4 PG (ref 27–31)
MCHC RBC AUTO-ENTMCNC: 34.4 G/DL (ref 32–36)
MCV RBC AUTO: 91 FL (ref 82–98)
MONOCYTES # BLD AUTO: 1.4 K/UL (ref 0.3–1)
MONOCYTES NFR BLD: 9.3 % (ref 4–15)
NEUTROPHILS # BLD AUTO: 10.9 K/UL (ref 1.8–7.7)
NEUTROPHILS NFR BLD: 73.9 % (ref 38–73)
NRBC BLD-RTO: 0 /100 WBC
PLATELET # BLD AUTO: 337 K/UL (ref 150–450)
PMV BLD AUTO: 10.2 FL (ref 9.2–12.9)
POTASSIUM SERPL-SCNC: 4.3 MMOL/L (ref 3.5–5.1)
PROT SERPL-MCNC: 7.8 G/DL (ref 6–8.4)
RBC # BLD AUTO: 4.9 M/UL (ref 4.6–6.2)
SODIUM SERPL-SCNC: 141 MMOL/L (ref 136–145)
TSH SERPL DL<=0.005 MIU/L-ACNC: 2.87 UIU/ML (ref 0.4–4)
WBC # BLD AUTO: 14.7 K/UL (ref 3.9–12.7)

## 2022-07-21 PROCEDURE — 99214 PR OFFICE/OUTPT VISIT, EST, LEVL IV, 30-39 MIN: ICD-10-PCS | Mod: S$GLB,,, | Performed by: NURSE PRACTITIONER

## 2022-07-21 PROCEDURE — 3008F BODY MASS INDEX DOCD: CPT | Mod: CPTII,S$GLB,, | Performed by: NURSE PRACTITIONER

## 2022-07-21 PROCEDURE — 99999 PR PBB SHADOW E&M-EST. PATIENT-LVL III: ICD-10-PCS | Mod: PBBFAC,,, | Performed by: NURSE PRACTITIONER

## 2022-07-21 PROCEDURE — 4010F ACE/ARB THERAPY RXD/TAKEN: CPT | Mod: CPTII,S$GLB,, | Performed by: NURSE PRACTITIONER

## 2022-07-21 PROCEDURE — 3074F SYST BP LT 130 MM HG: CPT | Mod: CPTII,S$GLB,, | Performed by: NURSE PRACTITIONER

## 2022-07-21 PROCEDURE — 93010 ELECTROCARDIOGRAM REPORT: CPT | Mod: S$GLB,,, | Performed by: INTERNAL MEDICINE

## 2022-07-21 PROCEDURE — 3074F PR MOST RECENT SYSTOLIC BLOOD PRESSURE < 130 MM HG: ICD-10-PCS | Mod: CPTII,S$GLB,, | Performed by: NURSE PRACTITIONER

## 2022-07-21 PROCEDURE — 1126F PR PAIN SEVERITY QUANTIFIED, NO PAIN PRESENT: ICD-10-PCS | Mod: CPTII,S$GLB,, | Performed by: NURSE PRACTITIONER

## 2022-07-21 PROCEDURE — 1101F PT FALLS ASSESS-DOCD LE1/YR: CPT | Mod: CPTII,S$GLB,, | Performed by: NURSE PRACTITIONER

## 2022-07-21 PROCEDURE — 84443 ASSAY THYROID STIM HORMONE: CPT | Performed by: NURSE PRACTITIONER

## 2022-07-21 PROCEDURE — 3008F PR BODY MASS INDEX (BMI) DOCUMENTED: ICD-10-PCS | Mod: CPTII,S$GLB,, | Performed by: NURSE PRACTITIONER

## 2022-07-21 PROCEDURE — 3078F DIAST BP <80 MM HG: CPT | Mod: CPTII,S$GLB,, | Performed by: NURSE PRACTITIONER

## 2022-07-21 PROCEDURE — 80053 COMPREHEN METABOLIC PANEL: CPT | Performed by: NURSE PRACTITIONER

## 2022-07-21 PROCEDURE — 4010F PR ACE/ARB THEARPY RXD/TAKEN: ICD-10-PCS | Mod: CPTII,S$GLB,, | Performed by: NURSE PRACTITIONER

## 2022-07-21 PROCEDURE — 1101F PR PT FALLS ASSESS DOC 0-1 FALLS W/OUT INJ PAST YR: ICD-10-PCS | Mod: CPTII,S$GLB,, | Performed by: NURSE PRACTITIONER

## 2022-07-21 PROCEDURE — 93005 EKG 12-LEAD: ICD-10-PCS | Mod: S$GLB,,, | Performed by: NURSE PRACTITIONER

## 2022-07-21 PROCEDURE — 1126F AMNT PAIN NOTED NONE PRSNT: CPT | Mod: CPTII,S$GLB,, | Performed by: NURSE PRACTITIONER

## 2022-07-21 PROCEDURE — 3288F PR FALLS RISK ASSESSMENT DOCUMENTED: ICD-10-PCS | Mod: CPTII,S$GLB,, | Performed by: NURSE PRACTITIONER

## 2022-07-21 PROCEDURE — 85025 COMPLETE CBC W/AUTO DIFF WBC: CPT | Performed by: NURSE PRACTITIONER

## 2022-07-21 PROCEDURE — 36415 COLL VENOUS BLD VENIPUNCTURE: CPT | Performed by: NURSE PRACTITIONER

## 2022-07-21 PROCEDURE — 93010 EKG 12-LEAD: ICD-10-PCS | Mod: S$GLB,,, | Performed by: INTERNAL MEDICINE

## 2022-07-21 PROCEDURE — 93005 ELECTROCARDIOGRAM TRACING: CPT | Mod: S$GLB,,, | Performed by: NURSE PRACTITIONER

## 2022-07-21 PROCEDURE — 82550 ASSAY OF CK (CPK): CPT | Performed by: NURSE PRACTITIONER

## 2022-07-21 PROCEDURE — 99214 OFFICE O/P EST MOD 30 MIN: CPT | Mod: S$GLB,,, | Performed by: NURSE PRACTITIONER

## 2022-07-21 PROCEDURE — 99999 PR PBB SHADOW E&M-EST. PATIENT-LVL III: CPT | Mod: PBBFAC,,, | Performed by: NURSE PRACTITIONER

## 2022-07-21 PROCEDURE — 1159F PR MEDICATION LIST DOCUMENTED IN MEDICAL RECORD: ICD-10-PCS | Mod: CPTII,S$GLB,, | Performed by: NURSE PRACTITIONER

## 2022-07-21 PROCEDURE — 3288F FALL RISK ASSESSMENT DOCD: CPT | Mod: CPTII,S$GLB,, | Performed by: NURSE PRACTITIONER

## 2022-07-21 PROCEDURE — 1159F MED LIST DOCD IN RCRD: CPT | Mod: CPTII,S$GLB,, | Performed by: NURSE PRACTITIONER

## 2022-07-21 PROCEDURE — 3078F PR MOST RECENT DIASTOLIC BLOOD PRESSURE < 80 MM HG: ICD-10-PCS | Mod: CPTII,S$GLB,, | Performed by: NURSE PRACTITIONER

## 2022-07-21 NOTE — PROGRESS NOTES
Discussed labs with Dr. Pineda  ^ BUN suggestive of mild dehydration, continue to hold aldactone and increase oral hydration  Mild ^ WBC  I would like to add mono spot to do today or tomorrow and Dr. Pineda recomending repeat cbc in 1 week

## 2022-07-21 NOTE — PROGRESS NOTES
"Subjective:           Patient ID: Juan Young is a 66 y.o. male.    Chief Complaint: Dizziness and Fatigue    Juan Young is a 66 y.o. male        Known to me from acute visit, Known hx of CAD s/p PCI ,  [facility] last  March and told all stents patent   Notes Hx of  dizziness since he is 20.   Here today with c/o fatigue; " I want to sleep all the time". I wake up tired"   + hx of RENEE and not using CPAP , states he can't afford     Seen Dr. Pineda 3 days ago for dizziness; per Dr. Pineda instructed to   stop aldactone for 1 week   " He is working in very hot weather .looks dehydrated   Hydrate.gatorade  If not better will do labs  EKG normal on exam ; exam normal  Will get labs     Review of Systems   Constitutional: Positive for fatigue.   HENT: Negative for congestion, ear pain, sinus pressure, sneezing and sore throat.    Eyes: Negative.    Respiratory: Negative for cough, chest tightness, shortness of breath and wheezing.    Cardiovascular: Negative.  Negative for chest pain.   Gastrointestinal: Negative for abdominal pain, blood in stool, constipation, diarrhea, nausea and vomiting.   Genitourinary: Negative for difficulty urinating, dysuria and flank pain.   Musculoskeletal: Negative.  Negative for joint swelling.   Skin: Negative.    Neurological: Positive for dizziness. Negative for weakness and headaches.   Hematological: Negative.    Psychiatric/Behavioral: Negative.  Negative for behavioral problems and confusion.       Objective:      Physical Exam  Vitals and nursing note reviewed.   Constitutional:       Appearance: He is well-developed.   HENT:      Head: Normocephalic and atraumatic.      Nose: Nose normal.   Eyes:      Conjunctiva/sclera: Conjunctivae normal.      Pupils: Pupils are equal, round, and reactive to light.   Neck:      Thyroid: No thyromegaly.      Vascular: No JVD.   Cardiovascular:      Rate and Rhythm: Normal rate and regular rhythm.      Heart sounds: Normal heart sounds.   Pulmonary: "      Effort: Pulmonary effort is normal.      Breath sounds: Normal breath sounds.   Abdominal:      General: Bowel sounds are normal. There is no distension.      Palpations: Abdomen is soft. There is no mass.      Tenderness: There is no abdominal tenderness. There is no guarding.   Musculoskeletal:         General: Normal range of motion.      Cervical back: Normal range of motion and neck supple.   Lymphadenopathy:      Cervical: No cervical adenopathy.   Skin:     General: Skin is warm and dry.      Coloration: Skin is not pale.      Findings: No rash.   Neurological:      Mental Status: He is alert and oriented to person, place, and time.      Cranial Nerves: No cranial nerve deficit.      Deep Tendon Reflexes: Reflexes are normal and symmetric.         Assessment:       1. Fatigue, unspecified type    2. Primary hypertension    3. RENEE (obstructive sleep apnea)    4. Obesity (BMI 30.0-34.9)        Plan:   Juan was seen today for dizziness and fatigue.    Diagnoses and all orders for this visit:    Fatigue, unspecified type  -     IN OFFICE EKG 12-LEAD (to Muse)  -     Comprehensive Metabolic Panel; Future  -     CBC Auto Differential; Future  -     TSH; Future    Primary hypertension    RENEE (obstructive sleep apnea)    Obesity (BMI 30.0-34.9)      Problem List Items Addressed This Visit        Cardiac/Vascular    HTN (hypertension)       Endocrine    Obesity (BMI 30.0-34.9)      Other Visit Diagnoses     Fatigue, unspecified type    -  Primary    Relevant Orders    IN OFFICE EKG 12-LEAD (to Flora)    Comprehensive Metabolic Panel    CBC Auto Differential    TSH    RENEE (obstructive sleep apnea)

## 2022-07-22 ENCOUNTER — LAB VISIT (OUTPATIENT)
Dept: LAB | Facility: HOSPITAL | Age: 67
End: 2022-07-22
Attending: NURSE PRACTITIONER
Payer: MEDICARE

## 2022-07-22 ENCOUNTER — TELEPHONE (OUTPATIENT)
Dept: INTERNAL MEDICINE | Facility: CLINIC | Age: 67
End: 2022-07-22
Payer: MEDICARE

## 2022-07-22 ENCOUNTER — TELEPHONE (OUTPATIENT)
Dept: HEPATOLOGY | Facility: HOSPITAL | Age: 67
End: 2022-07-22
Payer: MEDICARE

## 2022-07-22 DIAGNOSIS — R53.83 FATIGUE, UNSPECIFIED TYPE: Primary | ICD-10-CM

## 2022-07-22 DIAGNOSIS — R53.83 FATIGUE, UNSPECIFIED TYPE: ICD-10-CM

## 2022-07-22 DIAGNOSIS — D72.829 LEUKOCYTOSIS, UNSPECIFIED TYPE: ICD-10-CM

## 2022-07-22 LAB
BASOPHILS # BLD AUTO: 0.09 K/UL (ref 0–0.2)
BASOPHILS NFR BLD: 0.7 % (ref 0–1.9)
CK SERPL-CCNC: 59 U/L (ref 20–200)
DIFFERENTIAL METHOD: ABNORMAL
EOSINOPHIL # BLD AUTO: 0.1 K/UL (ref 0–0.5)
EOSINOPHIL NFR BLD: 0.9 % (ref 0–8)
ERYTHROCYTE [DISTWIDTH] IN BLOOD BY AUTOMATED COUNT: 13 % (ref 11.5–14.5)
HCT VFR BLD AUTO: 41.7 % (ref 40–54)
HETEROPH AB SERPL QL IA: NEGATIVE
HGB BLD-MCNC: 14.5 G/DL (ref 14–18)
IMM GRANULOCYTES # BLD AUTO: 0.06 K/UL (ref 0–0.04)
IMM GRANULOCYTES NFR BLD AUTO: 0.5 % (ref 0–0.5)
LYMPHOCYTES # BLD AUTO: 2.1 K/UL (ref 1–4.8)
LYMPHOCYTES NFR BLD: 16.7 % (ref 18–48)
MCH RBC QN AUTO: 31.6 PG (ref 27–31)
MCHC RBC AUTO-ENTMCNC: 34.8 G/DL (ref 32–36)
MCV RBC AUTO: 91 FL (ref 82–98)
MONOCYTES # BLD AUTO: 1 K/UL (ref 0.3–1)
MONOCYTES NFR BLD: 8.1 % (ref 4–15)
NEUTROPHILS # BLD AUTO: 9.3 K/UL (ref 1.8–7.7)
NEUTROPHILS NFR BLD: 73.1 % (ref 38–73)
NRBC BLD-RTO: 0 /100 WBC
PLATELET # BLD AUTO: 304 K/UL (ref 150–450)
PMV BLD AUTO: 10.3 FL (ref 9.2–12.9)
RBC # BLD AUTO: 4.59 M/UL (ref 4.6–6.2)
WBC # BLD AUTO: 12.66 K/UL (ref 3.9–12.7)

## 2022-07-22 PROCEDURE — 36415 COLL VENOUS BLD VENIPUNCTURE: CPT | Performed by: NURSE PRACTITIONER

## 2022-07-22 PROCEDURE — 85025 COMPLETE CBC W/AUTO DIFF WBC: CPT | Performed by: NURSE PRACTITIONER

## 2022-07-22 PROCEDURE — 86308 HETEROPHILE ANTIBODY SCREEN: CPT | Performed by: NURSE PRACTITIONER

## 2022-07-22 NOTE — PROGRESS NOTES
Mono spot negative so rule out for mono  Repeat CBC with WBC trending down. Was actually supposed to be next week.   Possibly something viral going on, just need to hydrate really well and give it a little more time   His other labs were normal  on CMP- liver , kidney and electrolytes so that is reassuring.

## 2022-07-22 NOTE — PROGRESS NOTES
Cpk which is a muscle enzyme that I added is normal  Sent message yesterday to try and get mono-spot

## 2022-07-22 NOTE — TELEPHONE ENCOUNTER
See message with labs  Mono spot negative so rule out for mono  Repeat CBC with WBC trending down. Was actually supposed to be next week.   Possibly something viral going on, just need to hydrate really well and give it a little more time   His other labs were normal  on CMP- liver , kidney and electrolytes so that is reassuring.

## 2022-07-22 NOTE — TELEPHONE ENCOUNTER
"----- Message from Audra Ball sent at 2022 12:36 PM CDT -----  Regarding: Request to speak to a nurse  Contact: kathie Young  MRN: [id number]  : 1955  PCP: Soren Pineda  Home Phone      [phone]  Work Phone      Not on file.  Mobile          [phone]      MESSAGE:   Request to speak to a nurse regarding lab results performed on 22 at [facility].   Patient c/o tired, dizzy, "wants to sleep not as bad as yesterday" and "just does not feel right."    22 - Blood Pressure Readings - at home:  BP - 160/75 - 7:30 am   BP - 145/70 - 8:30 am   BP - 147/71 - 12:40 pm    Appointment with Maritza Coreas NP - 22  PCP - Dr. Pineda     Phone # [phone]    Pharmacy - [facility] [address] [place] [address]      "

## 2022-07-25 ENCOUNTER — TELEPHONE (OUTPATIENT)
Dept: INTERNAL MEDICINE | Facility: CLINIC | Age: 67
End: 2022-07-25
Payer: MEDICARE

## 2022-07-25 NOTE — TELEPHONE ENCOUNTER
----- Message from Chapis Aguilera sent at 2022  3:04 PM CDT -----  Contact: Pt  Juan Young  MRN: [id number]  : 1955  PCP: Soren Pineda  Home Phone      [phone]  Work Phone      Not on file.  Mobile          [phone]      MESSAGE: Pt has an appt with Dr Pineda Thursday at 10:45 but wants an earlier appt with any of our providers. Pt states he is dizzy, and just wants to sleep all the time.      [phone]

## 2022-07-25 NOTE — TELEPHONE ENCOUNTER
Spoke to pt. Explained that we had no availability with any providers before Thursday. Instructed that if symptoms were too bad for him to wait until Thursday he should go to er.

## 2022-07-28 ENCOUNTER — OFFICE VISIT (OUTPATIENT)
Dept: INTERNAL MEDICINE | Facility: CLINIC | Age: 67
End: 2022-07-28
Payer: MEDICARE

## 2022-07-28 VITALS
OXYGEN SATURATION: 95 % | BODY MASS INDEX: 30.42 KG/M2 | HEIGHT: 76 IN | SYSTOLIC BLOOD PRESSURE: 130 MMHG | WEIGHT: 249.81 LBS | DIASTOLIC BLOOD PRESSURE: 80 MMHG | RESPIRATION RATE: 18 BRPM | HEART RATE: 96 BPM

## 2022-07-28 DIAGNOSIS — I95.1 ORTHOSTATIC HYPOTENSION: Primary | ICD-10-CM

## 2022-07-28 PROCEDURE — 3075F SYST BP GE 130 - 139MM HG: CPT | Mod: CPTII,S$GLB,, | Performed by: INTERNAL MEDICINE

## 2022-07-28 PROCEDURE — 1101F PR PT FALLS ASSESS DOC 0-1 FALLS W/OUT INJ PAST YR: ICD-10-PCS | Mod: CPTII,S$GLB,, | Performed by: INTERNAL MEDICINE

## 2022-07-28 PROCEDURE — 3288F FALL RISK ASSESSMENT DOCD: CPT | Mod: CPTII,S$GLB,, | Performed by: INTERNAL MEDICINE

## 2022-07-28 PROCEDURE — 3079F DIAST BP 80-89 MM HG: CPT | Mod: CPTII,S$GLB,, | Performed by: INTERNAL MEDICINE

## 2022-07-28 PROCEDURE — 3075F PR MOST RECENT SYSTOLIC BLOOD PRESS GE 130-139MM HG: ICD-10-PCS | Mod: CPTII,S$GLB,, | Performed by: INTERNAL MEDICINE

## 2022-07-28 PROCEDURE — 1159F MED LIST DOCD IN RCRD: CPT | Mod: CPTII,S$GLB,, | Performed by: INTERNAL MEDICINE

## 2022-07-28 PROCEDURE — 1101F PT FALLS ASSESS-DOCD LE1/YR: CPT | Mod: CPTII,S$GLB,, | Performed by: INTERNAL MEDICINE

## 2022-07-28 PROCEDURE — 1126F AMNT PAIN NOTED NONE PRSNT: CPT | Mod: CPTII,S$GLB,, | Performed by: INTERNAL MEDICINE

## 2022-07-28 PROCEDURE — 1159F PR MEDICATION LIST DOCUMENTED IN MEDICAL RECORD: ICD-10-PCS | Mod: CPTII,S$GLB,, | Performed by: INTERNAL MEDICINE

## 2022-07-28 PROCEDURE — 1160F RVW MEDS BY RX/DR IN RCRD: CPT | Mod: CPTII,S$GLB,, | Performed by: INTERNAL MEDICINE

## 2022-07-28 PROCEDURE — 99213 PR OFFICE/OUTPT VISIT, EST, LEVL III, 20-29 MIN: ICD-10-PCS | Mod: S$GLB,,, | Performed by: INTERNAL MEDICINE

## 2022-07-28 PROCEDURE — 1126F PR PAIN SEVERITY QUANTIFIED, NO PAIN PRESENT: ICD-10-PCS | Mod: CPTII,S$GLB,, | Performed by: INTERNAL MEDICINE

## 2022-07-28 PROCEDURE — 3079F PR MOST RECENT DIASTOLIC BLOOD PRESSURE 80-89 MM HG: ICD-10-PCS | Mod: CPTII,S$GLB,, | Performed by: INTERNAL MEDICINE

## 2022-07-28 PROCEDURE — 3008F BODY MASS INDEX DOCD: CPT | Mod: CPTII,S$GLB,, | Performed by: INTERNAL MEDICINE

## 2022-07-28 PROCEDURE — 4010F ACE/ARB THERAPY RXD/TAKEN: CPT | Mod: CPTII,S$GLB,, | Performed by: INTERNAL MEDICINE

## 2022-07-28 PROCEDURE — 99999 PR PBB SHADOW E&M-EST. PATIENT-LVL III: ICD-10-PCS | Mod: PBBFAC,,, | Performed by: INTERNAL MEDICINE

## 2022-07-28 PROCEDURE — 99213 OFFICE O/P EST LOW 20 MIN: CPT | Mod: S$GLB,,, | Performed by: INTERNAL MEDICINE

## 2022-07-28 PROCEDURE — 3288F PR FALLS RISK ASSESSMENT DOCUMENTED: ICD-10-PCS | Mod: CPTII,S$GLB,, | Performed by: INTERNAL MEDICINE

## 2022-07-28 PROCEDURE — 3008F PR BODY MASS INDEX (BMI) DOCUMENTED: ICD-10-PCS | Mod: CPTII,S$GLB,, | Performed by: INTERNAL MEDICINE

## 2022-07-28 PROCEDURE — 4010F PR ACE/ARB THEARPY RXD/TAKEN: ICD-10-PCS | Mod: CPTII,S$GLB,, | Performed by: INTERNAL MEDICINE

## 2022-07-28 PROCEDURE — 99999 PR PBB SHADOW E&M-EST. PATIENT-LVL III: CPT | Mod: PBBFAC,,, | Performed by: INTERNAL MEDICINE

## 2022-07-28 PROCEDURE — 1160F PR REVIEW ALL MEDS BY PRESCRIBER/CLIN PHARMACIST DOCUMENTED: ICD-10-PCS | Mod: CPTII,S$GLB,, | Performed by: INTERNAL MEDICINE

## 2022-07-28 NOTE — PROGRESS NOTES
"Subjective:       Patient ID: Juan Young is a 66 y.o. male.    Chief Complaint: Dizziness and Fatigue    Juan Young is a 66 y.o. male  Here with dizziness ; he reports he stopped her meds " all of them " and felt better   Yesterday took 1/2 of BP pills and now dizzy       Lying :130/80  Sitting : 122/76  Standing : 102/70    Review of Systems   Constitutional: Negative for chills and fever.   HENT: Negative for congestion, postnasal drip and sore throat.    Eyes: Negative for photophobia.   Respiratory: Negative for chest tightness and shortness of breath.    Cardiovascular: Negative for chest pain.   Gastrointestinal: Negative for abdominal distention, abdominal pain, blood in stool and vomiting.   Genitourinary: Negative for dysuria, flank pain and hematuria.   Musculoskeletal: Negative for back pain.   Skin: Negative for pallor.   Neurological: Negative for dizziness, seizures, facial asymmetry, speech difficulty and numbness.   Hematological: Does not bruise/bleed easily.   Psychiatric/Behavioral: Negative for agitation and suicidal ideas. The patient is not nervous/anxious.        Objective:      Physical Exam  Vitals and nursing note reviewed.   Constitutional:       Appearance: He is well-developed.   HENT:      Head: Normocephalic and atraumatic.      Nose: Nose normal.   Eyes:      Conjunctiva/sclera: Conjunctivae normal.      Pupils: Pupils are equal, round, and reactive to light.   Neck:      Thyroid: No thyromegaly.      Vascular: No JVD.   Cardiovascular:      Rate and Rhythm: Normal rate and regular rhythm.      Heart sounds: Normal heart sounds.   Pulmonary:      Effort: Pulmonary effort is normal.      Breath sounds: Normal breath sounds.   Abdominal:      General: Bowel sounds are normal. There is no distension.      Palpations: Abdomen is soft. There is no mass.      Tenderness: There is no abdominal tenderness. There is no guarding.   Musculoskeletal:         General: Normal range of motion. "      Cervical back: Normal range of motion and neck supple.   Lymphadenopathy:      Cervical: No cervical adenopathy.   Skin:     General: Skin is warm and dry.      Coloration: Skin is not pale.      Findings: No rash.   Neurological:      Mental Status: He is alert and oriented to person, place, and time.      Cranial Nerves: No cranial nerve deficit.      Deep Tendon Reflexes: Reflexes are normal and symmetric.         Assessment:       1. Orthostatic hypotension        Plan:   Juan was seen today for dizziness and fatigue.    Diagnoses and all orders for this visit:    Orthostatic hypotension    stop amlodipine  And lisinopril   Stay off aldactone     Continue metoprolol; plavix; atorvastatin     RTC ; Monday with BP machine ; nurse bp check     Problem List Items Addressed This Visit    None

## 2022-08-01 ENCOUNTER — TELEPHONE (OUTPATIENT)
Dept: INTERNAL MEDICINE | Facility: CLINIC | Age: 67
End: 2022-08-01

## 2022-08-01 ENCOUNTER — CLINICAL SUPPORT (OUTPATIENT)
Dept: INTERNAL MEDICINE | Facility: CLINIC | Age: 67
End: 2022-08-01
Payer: MEDICARE

## 2022-08-01 DIAGNOSIS — G47.33 OBSTRUCTIVE SLEEP APNEA (ADULT) (PEDIATRIC): Primary | ICD-10-CM

## 2022-08-01 DIAGNOSIS — I10 PRIMARY HYPERTENSION: Primary | ICD-10-CM

## 2022-08-01 NOTE — PROGRESS NOTES
Pt blood pressure was 132/82 manual   Blood pressure machine it is 152 92     Blood pressure machine 159 84   Manual is 136 78

## 2022-08-01 NOTE — TELEPHONE ENCOUNTER
----- Message from Chapis Aguilera sent at 2022  3:08 PM CDT -----  Contact: Pt  Juan Young  MRN: [id number]  : 1955  PCP: Soren Pineda  Home Phone      [phone]  Work Phone      Not on file.  Mobile          [phone]      MESSAGE: Pt states he talked to his Heart doctor and the heart doctor suggested he see Dr. Pineda about a c pap machine. Pt states he did a sleep test about a year ago but he couldn't afford the machine. Pt has a different Insurance now so he thinks they will cover the cost of a machine. Please advise if you can prescribe a machine from the last sleep test.      [phone]

## 2022-08-01 NOTE — TELEPHONE ENCOUNTER
Patient had sleep study about 3 years ago. It is in chart. Patient could not afford cpap machine at the time of the sleep study. Patient asking if you can order cpap using that last sleep study?

## 2022-08-12 ENCOUNTER — HOSPITAL ENCOUNTER (EMERGENCY)
Facility: HOSPITAL | Age: 67
Discharge: HOME | End: 2022-08-13
Attending: SURGERY
Payer: MEDICARE

## 2022-08-12 DIAGNOSIS — K21.9 GASTROESOPHAGEAL REFLUX DISEASE WITHOUT ESOPHAGITIS: Primary | ICD-10-CM

## 2022-08-12 LAB
BASOPHILS # BLD AUTO: 0.08 K/UL (ref 0–0.2)
BASOPHILS NFR BLD: 0.9 % (ref 0–1.9)
DIFFERENTIAL METHOD: ABNORMAL
EOSINOPHIL # BLD AUTO: 0.2 K/UL (ref 0–0.5)
EOSINOPHIL NFR BLD: 1.7 % (ref 0–8)
ERYTHROCYTE [DISTWIDTH] IN BLOOD BY AUTOMATED COUNT: 13.2 % (ref 11.5–14.5)
HCT VFR BLD AUTO: 43.7 % (ref 40–54)
HGB BLD-MCNC: 15.1 G/DL (ref 14–18)
IMM GRANULOCYTES # BLD AUTO: 0.03 K/UL (ref 0–0.04)
IMM GRANULOCYTES NFR BLD AUTO: 0.3 % (ref 0–0.5)
LYMPHOCYTES # BLD AUTO: 2.5 K/UL (ref 1–4.8)
LYMPHOCYTES NFR BLD: 29.1 % (ref 18–48)
MCH RBC QN AUTO: 32.4 PG (ref 27–31)
MCHC RBC AUTO-ENTMCNC: 34.6 G/DL (ref 32–36)
MCV RBC AUTO: 94 FL (ref 82–98)
MONOCYTES # BLD AUTO: 0.6 K/UL (ref 0.3–1)
MONOCYTES NFR BLD: 7.2 % (ref 4–15)
NEUTROPHILS # BLD AUTO: 5.2 K/UL (ref 1.8–7.7)
NEUTROPHILS NFR BLD: 60.8 % (ref 38–73)
NRBC BLD-RTO: 0 /100 WBC
PLATELET # BLD AUTO: 224 K/UL (ref 150–450)
PMV BLD AUTO: 10 FL (ref 9.2–12.9)
RBC # BLD AUTO: 4.66 M/UL (ref 4.6–6.2)
WBC # BLD AUTO: 8.58 K/UL (ref 3.9–12.7)

## 2022-08-12 PROCEDURE — 99285 EMERGENCY DEPT VISIT HI MDM: CPT | Mod: 25

## 2022-08-12 PROCEDURE — 84484 ASSAY OF TROPONIN QUANT: CPT | Performed by: SURGERY

## 2022-08-12 PROCEDURE — 85379 FIBRIN DEGRADATION QUANT: CPT | Performed by: SURGERY

## 2022-08-12 PROCEDURE — 93010 EKG 12-LEAD: ICD-10-PCS | Mod: ,,, | Performed by: INTERNAL MEDICINE

## 2022-08-12 PROCEDURE — 82553 CREATINE MB FRACTION: CPT | Performed by: SURGERY

## 2022-08-12 PROCEDURE — 93010 ELECTROCARDIOGRAM REPORT: CPT | Mod: ,,, | Performed by: INTERNAL MEDICINE

## 2022-08-12 PROCEDURE — 36415 COLL VENOUS BLD VENIPUNCTURE: CPT | Performed by: SURGERY

## 2022-08-12 PROCEDURE — 25000003 PHARM REV CODE 250: Performed by: SURGERY

## 2022-08-12 PROCEDURE — 85025 COMPLETE CBC W/AUTO DIFF WBC: CPT | Performed by: SURGERY

## 2022-08-12 PROCEDURE — 80053 COMPREHEN METABOLIC PANEL: CPT | Performed by: SURGERY

## 2022-08-12 PROCEDURE — 83880 ASSAY OF NATRIURETIC PEPTIDE: CPT | Performed by: SURGERY

## 2022-08-12 PROCEDURE — 93005 ELECTROCARDIOGRAM TRACING: CPT

## 2022-08-12 RX ORDER — ASPIRIN 325 MG
325 TABLET ORAL
Status: COMPLETED | OUTPATIENT
Start: 2022-08-12 | End: 2022-08-12

## 2022-08-12 RX ADMIN — ASPIRIN 325 MG ORAL TABLET 325 MG: 325 PILL ORAL at 11:08

## 2022-08-13 VITALS
TEMPERATURE: 97 F | OXYGEN SATURATION: 97 % | SYSTOLIC BLOOD PRESSURE: 137 MMHG | RESPIRATION RATE: 20 BRPM | DIASTOLIC BLOOD PRESSURE: 85 MMHG | HEART RATE: 81 BPM | BODY MASS INDEX: 30.7 KG/M2 | WEIGHT: 252.19 LBS

## 2022-08-13 LAB
ALBUMIN SERPL BCP-MCNC: 3.9 G/DL (ref 3.5–5.2)
ALP SERPL-CCNC: 102 U/L (ref 55–135)
ALT SERPL W/O P-5'-P-CCNC: 28 U/L (ref 10–44)
ANION GAP SERPL CALC-SCNC: 11 MMOL/L (ref 8–16)
AST SERPL-CCNC: 17 U/L (ref 10–40)
BILIRUB SERPL-MCNC: 1 MG/DL (ref 0.1–1)
BNP SERPL-MCNC: 23 PG/ML (ref 0–99)
BUN SERPL-MCNC: 28 MG/DL (ref 8–23)
CALCIUM SERPL-MCNC: 9.6 MG/DL (ref 8.7–10.5)
CHLORIDE SERPL-SCNC: 101 MMOL/L (ref 95–110)
CK MB SERPL-MCNC: 1 NG/ML (ref 0.1–6.5)
CK MB SERPL-RTO: 1.4 % (ref 0–5)
CK SERPL-CCNC: 73 U/L (ref 20–200)
CK SERPL-CCNC: 73 U/L (ref 20–200)
CO2 SERPL-SCNC: 25 MMOL/L (ref 23–29)
CREAT SERPL-MCNC: 0.9 MG/DL (ref 0.5–1.4)
D DIMER PPP IA.FEU-MCNC: 0.19 MG/L FEU
EST. GFR  (NO RACE VARIABLE): >60 ML/MIN/1.73 M^2
GLUCOSE SERPL-MCNC: 149 MG/DL (ref 70–110)
POTASSIUM SERPL-SCNC: 4.2 MMOL/L (ref 3.5–5.1)
PROT SERPL-MCNC: 7.4 G/DL (ref 6–8.4)
SODIUM SERPL-SCNC: 137 MMOL/L (ref 136–145)
TROPONIN I SERPL DL<=0.01 NG/ML-MCNC: <0.006 NG/ML (ref 0–0.03)
TROPONIN I SERPL DL<=0.01 NG/ML-MCNC: <0.006 NG/ML (ref 0–0.03)

## 2022-08-13 PROCEDURE — 36415 COLL VENOUS BLD VENIPUNCTURE: CPT | Performed by: SURGERY

## 2022-08-13 PROCEDURE — 84484 ASSAY OF TROPONIN QUANT: CPT | Performed by: SURGERY

## 2022-08-13 RX ORDER — PANTOPRAZOLE SODIUM 40 MG/1
40 TABLET, DELAYED RELEASE ORAL DAILY
Qty: 30 TABLET | Refills: 0 | Status: SHIPPED | OUTPATIENT
Start: 2022-08-13 | End: 2023-09-18

## 2022-08-13 NOTE — ED PROVIDER NOTES
Encounter Date: 8/12/2022       History     Chief Complaint   Patient presents with    Chest Pain     C/o mid sternal chest pain that radiates to the throat; states the pain started 1 hour ago when he woke up; denies shortness of breath; states he had several heart attacks in the past; EKG in triage; vitals stable     66-year-old male with GERD like symptoms this evening prior to arrival  Patient with sleep and woke up with a pain in his throat per ER interview  Patient began belching with some nausea, no obvious chest pain noted  Patient states the pain was fleeting and went away, residual nausea after  Normal sinus rhythm on EKG, long history of coronary artery disease        Review of patient's allergies indicates:  No Known Allergies     Past Medical History:   Diagnosis Date    Anticoagulant long-term use     CAD S/P percutaneous coronary angioplasty 01/26/2018    Heart palpitations     History of pneumonectomy     Hyperlipidemia     Hypertension     Myocardial infarction 2013    stents x 1    Sleep apnea     NO CPAP- COULD NOT AFFORD     Past Surgical History:   Procedure Laterality Date    CARDIAC ANGIOGRAM WITH STENTS      X 2    LEFT HEART CATHETERIZATION Left 3/23/2021    Procedure: Left heart cath;  Surgeon: Ranjeet Estrada MD;  Location: [facility] CATH;  Service: Cardiology;  Laterality: Left;    left knee surgery      LUNG REMOVAL, TOTAL Left     lung removed  Left Lung Removed     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   HENT: Positive for sore throat. Negative for congestion, ear pain, mouth sores, nosebleeds, rhinorrhea, sinus pressure and sneezing.    Eyes: Negative for pain, discharge, redness and itching.   Respiratory: Negative for apnea, cough, chest tightness and shortness of breath.    Cardiovascular: Positive for chest pain. Negative for palpitations and leg swelling.   Gastrointestinal: Negative for abdominal distention, abdominal pain, anal bleeding, constipation, diarrhea, nausea and vomiting.        Belching and indigestion   Endocrine: Negative.    Genitourinary: Negative for dysuria, enuresis, flank pain and frequency.   Musculoskeletal: Negative for arthralgias, back pain, neck pain and neck stiffness.   Skin: Negative for color change and wound.   Allergic/Immunologic: Negative.    Neurological: Negative for dizziness, tremors, syncope, facial asymmetry, speech difficulty, weakness, light-headedness, numbness and headaches.   Hematological: Negative for adenopathy. Does not bruise/bleed easily.   Psychiatric/Behavioral: Negative for agitation, behavioral problems, hallucinations, self-injury and suicidal ideas. The patient is not nervous/anxious.        Physical Exam     Initial Vitals [08/12/22 2339]   BP Pulse Resp Temp SpO2   (!) 154/90 79 18 96.5 °F (35.8 °C) 97 %      MAP       --         Physical Exam    Constitutional: Vital signs are normal. He appears well-developed and well-nourished. He is cooperative.   HENT:   Head: Normocephalic and atraumatic.   Right Ear: Hearing, tympanic membrane, external ear and ear canal normal.   Left Ear: Hearing, tympanic membrane, external ear and ear canal normal.   Nose: Nose normal.   Mouth/Throat: Uvula is midline, oropharynx is clear and moist and mucous membranes are normal.   Eyes: Conjunctivae, EOM and lids are normal. Pupils are equal, round, and reactive to light.   Neck: Neck supple. No JVD present.   Normal range of  motion.   Full passive range of motion without pain.     Cardiovascular: Normal rate, regular rhythm, S1 normal, S2 normal, normal heart sounds, intact distal pulses and normal pulses.   Pulmonary/Chest: Effort normal and breath sounds normal.   Abdominal: Abdomen is soft and flat. Bowel sounds are normal.   Musculoskeletal:         General: Normal range of motion.      Cervical back: Full passive range of motion without pain, normal range of motion and neck supple.     Neurological: He is alert and oriented to person, place, and time. He has normal strength.   Skin: Skin is warm, dry and intact. Capillary refill takes less than 2 seconds.         ED Course   Procedures  Labs Reviewed   COMPREHENSIVE METABOLIC PANEL - Abnormal; Notable for the following components:       Result Value    Glucose 149 (*)     BUN 28 (*)     All other components within normal limits   CBC W/ AUTO DIFFERENTIAL - Abnormal; Notable for the following components:    MCH 32.4 (*)     All other components within normal limits   TROPONIN I   CK   CK-MB   B-TYPE NATRIURETIC PEPTIDE   D DIMER, QUANTITATIVE   TROPONIN I     EKG Readings: (Independently Interpreted)   Initial Reading: No STEMI. Rhythm: Normal Sinus Rhythm. Heart Rate: 79. Ectopy: No Ectopy. ST Segments: Normal ST Segments. T Waves: Normal. Axis: Normal.       Imaging Results          CT Chest Without Contrast (In process)                X-Ray Chest 1 View (Preliminary result)  Result time 08/13/22 04:16:43    ED Interpretation by Crow Arzola MD (08/13/22 04:16:43, [facility] - Emergency Dept, Emergency Medicine)                                   Medications   aspirin tablet 325 mg (325 mg Oral Given 8/12/22 6701)     Medical Decision Making:   Initial Assessment:   Indigestion and GERD, waking night of the sleep tonight  long history of coronary artery disease, no chest pain now  Patient woke up from a dead sleep with active belching PTA    Differential Diagnosis:   Indigestion,  angina, gastritis, GERD, cardiac event, peptic ulcer disease    Clinical Tests:   Lab Tests: Ordered and Reviewed  Radiological Study: Ordered and Reviewed  Medical Tests: Ordered and Reviewed    ED Management:  Normal sinus rhythm on EKG and 2-sets of troponins in the ER today  Chest x-ray shows a left lung opacification in the ER this morning  Patient did have a lung resection 20 years ago, CT chest ordered   CT of the chest shows left pneumonectomy without acute findings   Patient is still pain-free and asymptomatic, Protonix ordered on DC  Follow-up with appropriate physicians outpatient in the next 48 hours  Return to ER with any concerning signs symptoms after discharge                       Clinical Impression:   Final diagnoses:  [K21.9] Gastroesophageal reflux disease without esophagitis (Primary)                 Crow Arzola MD  08/13/22 0539

## 2022-08-13 NOTE — ED TRIAGE NOTES
66 y.o. male presents to ER Room/bed info not found   Chief Complaint   Patient presents with    Chest Pain     C/o mid sternal chest pain that radiates to the throat; states the pain started 1 hour ago when he woke up; denies shortness of breath; states he had several heart attacks in the past; EKG in triage; vitals stable   . No acute distress noted.

## 2022-08-15 PROBLEM — N43.3 LEFT HYDROCELE: Status: ACTIVE | Noted: 2022-08-15

## 2022-09-01 ENCOUNTER — TELEPHONE (OUTPATIENT)
Dept: INTERNAL MEDICINE | Facility: CLINIC | Age: 67
End: 2022-09-01
Payer: MEDICARE

## 2022-09-01 NOTE — TELEPHONE ENCOUNTER
Pt states he didn't know that Dr. Pineda ordered a CPAP for him and he saw Dr. Alexander who also ordered a CPAP for him. Now he said that insurance put a hold on it since he has 2 orders. He will contact insurance company and will follow up if he needs anything else from us.

## 2022-09-01 NOTE — TELEPHONE ENCOUNTER
----- Message from Audra Ball sent at 2022 12:44 PM CDT -----  Regarding: Request to speak to a nurse  Contact: self  Juan Young  MRN: [id number]  : 1955  PCP: Soren Pineda  Home Phone      [phone]  Work Phone      Not on file.  Mobile          [phone]      MESSAGE:   Request to speak to a nurse regarding the status of PCP orders for CPAP.   Patient saw Dr. Alexander about 3 weeks ago which he also ordered a CPAP machine    Dr. Alexander's office instructed patient to contact PCP office because he has two physician orders for a CPAP machine. Patient stated his insurance is questioning the orders for payment of two machines.      Phone # [phone]    Pharmacy - [facility] [address] [place] [address]

## 2022-09-24 ENCOUNTER — HOSPITAL ENCOUNTER (EMERGENCY)
Facility: HOSPITAL | Age: 67
Discharge: HOME | End: 2022-09-24
Attending: SURGERY
Payer: MEDICARE

## 2022-09-24 VITALS
HEART RATE: 65 BPM | OXYGEN SATURATION: 98 % | RESPIRATION RATE: 20 BRPM | SYSTOLIC BLOOD PRESSURE: 157 MMHG | BODY MASS INDEX: 30.74 KG/M2 | DIASTOLIC BLOOD PRESSURE: 80 MMHG | TEMPERATURE: 99 F | WEIGHT: 252.56 LBS

## 2022-09-24 DIAGNOSIS — N20.0 KIDNEY STONE: Primary | ICD-10-CM

## 2022-09-24 LAB
ALBUMIN SERPL BCP-MCNC: 3.9 G/DL (ref 3.5–5.2)
ALP SERPL-CCNC: 112 U/L (ref 55–135)
ALT SERPL W/O P-5'-P-CCNC: 22 U/L (ref 10–44)
ANION GAP SERPL CALC-SCNC: 11 MMOL/L (ref 8–16)
AST SERPL-CCNC: 15 U/L (ref 10–40)
BACTERIA #/AREA URNS HPF: ABNORMAL /HPF
BASOPHILS # BLD AUTO: 0.07 K/UL (ref 0–0.2)
BASOPHILS NFR BLD: 0.7 % (ref 0–1.9)
BILIRUB SERPL-MCNC: 0.8 MG/DL (ref 0.1–1)
BILIRUB UR QL STRIP: NEGATIVE
BUN SERPL-MCNC: 16 MG/DL (ref 8–23)
CALCIUM SERPL-MCNC: 9.3 MG/DL (ref 8.7–10.5)
CAOX CRY URNS QL MICRO: ABNORMAL
CHLORIDE SERPL-SCNC: 106 MMOL/L (ref 95–110)
CLARITY UR: CLEAR
CO2 SERPL-SCNC: 25 MMOL/L (ref 23–29)
COLOR UR: YELLOW
CREAT SERPL-MCNC: 0.9 MG/DL (ref 0.5–1.4)
DIFFERENTIAL METHOD: ABNORMAL
EOSINOPHIL # BLD AUTO: 0.1 K/UL (ref 0–0.5)
EOSINOPHIL NFR BLD: 1.2 % (ref 0–8)
ERYTHROCYTE [DISTWIDTH] IN BLOOD BY AUTOMATED COUNT: 13.3 % (ref 11.5–14.5)
EST. GFR  (NO RACE VARIABLE): >60 ML/MIN/1.73 M^2
GLUCOSE SERPL-MCNC: 115 MG/DL (ref 70–110)
GLUCOSE UR QL STRIP: NEGATIVE
HCT VFR BLD AUTO: 41.5 % (ref 40–54)
HGB BLD-MCNC: 14.2 G/DL (ref 14–18)
HGB UR QL STRIP: ABNORMAL
HYALINE CASTS #/AREA URNS LPF: 0 /LPF
IMM GRANULOCYTES # BLD AUTO: 0.05 K/UL (ref 0–0.04)
IMM GRANULOCYTES NFR BLD AUTO: 0.5 % (ref 0–0.5)
KETONES UR QL STRIP: NEGATIVE
LEUKOCYTE ESTERASE UR QL STRIP: NEGATIVE
LIPASE SERPL-CCNC: 21 U/L (ref 4–60)
LYMPHOCYTES # BLD AUTO: 1.3 K/UL (ref 1–4.8)
LYMPHOCYTES NFR BLD: 13.6 % (ref 18–48)
MCH RBC QN AUTO: 31.9 PG (ref 27–31)
MCHC RBC AUTO-ENTMCNC: 34.2 G/DL (ref 32–36)
MCV RBC AUTO: 93 FL (ref 82–98)
MICROSCOPIC COMMENT: ABNORMAL
MONOCYTES # BLD AUTO: 0.7 K/UL (ref 0.3–1)
MONOCYTES NFR BLD: 7.1 % (ref 4–15)
NEUTROPHILS # BLD AUTO: 7.6 K/UL (ref 1.8–7.7)
NEUTROPHILS NFR BLD: 76.9 % (ref 38–73)
NITRITE UR QL STRIP: NEGATIVE
NRBC BLD-RTO: 0 /100 WBC
PH UR STRIP: 6 [PH] (ref 5–8)
PLATELET # BLD AUTO: 225 K/UL (ref 150–450)
PMV BLD AUTO: 10.5 FL (ref 9.2–12.9)
POTASSIUM SERPL-SCNC: 4.4 MMOL/L (ref 3.5–5.1)
PROT SERPL-MCNC: 7 G/DL (ref 6–8.4)
PROT UR QL STRIP: ABNORMAL
RBC # BLD AUTO: 4.45 M/UL (ref 4.6–6.2)
RBC #/AREA URNS HPF: 30 /HPF (ref 0–4)
SODIUM SERPL-SCNC: 142 MMOL/L (ref 136–145)
SP GR UR STRIP: >=1.03 (ref 1–1.03)
URN SPEC COLLECT METH UR: ABNORMAL
UROBILINOGEN UR STRIP-ACNC: NEGATIVE EU/DL
WBC # BLD AUTO: 9.84 K/UL (ref 3.9–12.7)
WBC #/AREA URNS HPF: 3 /HPF (ref 0–5)

## 2022-09-24 PROCEDURE — 81000 URINALYSIS NONAUTO W/SCOPE: CPT

## 2022-09-24 PROCEDURE — 36415 COLL VENOUS BLD VENIPUNCTURE: CPT

## 2022-09-24 PROCEDURE — 25000003 PHARM REV CODE 250

## 2022-09-24 PROCEDURE — 85025 COMPLETE CBC W/AUTO DIFF WBC: CPT

## 2022-09-24 PROCEDURE — 99285 EMERGENCY DEPT VISIT HI MDM: CPT | Mod: 25

## 2022-09-24 PROCEDURE — 96375 TX/PRO/DX INJ NEW DRUG ADDON: CPT

## 2022-09-24 PROCEDURE — 96361 HYDRATE IV INFUSION ADD-ON: CPT

## 2022-09-24 PROCEDURE — 63600175 PHARM REV CODE 636 W HCPCS

## 2022-09-24 PROCEDURE — 96374 THER/PROPH/DIAG INJ IV PUSH: CPT

## 2022-09-24 PROCEDURE — 80053 COMPREHEN METABOLIC PANEL: CPT

## 2022-09-24 PROCEDURE — 83690 ASSAY OF LIPASE: CPT

## 2022-09-24 RX ORDER — TAMSULOSIN HYDROCHLORIDE 0.4 MG/1
0.4 CAPSULE ORAL DAILY
Qty: 30 CAPSULE | Refills: 0 | Status: SHIPPED | OUTPATIENT
Start: 2022-09-24 | End: 2023-09-18

## 2022-09-24 RX ORDER — OXYCODONE AND ACETAMINOPHEN 5; 325 MG/1; MG/1
1 TABLET ORAL
Status: DISCONTINUED | OUTPATIENT
Start: 2022-09-24 | End: 2022-09-24 | Stop reason: HOSPADM

## 2022-09-24 RX ORDER — HYDROCODONE BITARTRATE AND ACETAMINOPHEN 5; 325 MG/1; MG/1
1 TABLET ORAL EVERY 8 HOURS PRN
Qty: 15 TABLET | Refills: 0 | Status: SHIPPED | OUTPATIENT
Start: 2022-09-24 | End: 2022-09-29

## 2022-09-24 RX ORDER — ONDANSETRON 2 MG/ML
4 INJECTION INTRAMUSCULAR; INTRAVENOUS
Status: COMPLETED | OUTPATIENT
Start: 2022-09-24 | End: 2022-09-24

## 2022-09-24 RX ORDER — MELOXICAM 7.5 MG/1
7.5 TABLET ORAL DAILY
Qty: 5 TABLET | Refills: 0 | Status: SHIPPED | OUTPATIENT
Start: 2022-09-24 | End: 2022-09-29

## 2022-09-24 RX ORDER — KETOROLAC TROMETHAMINE 30 MG/ML
15 INJECTION, SOLUTION INTRAMUSCULAR; INTRAVENOUS
Status: COMPLETED | OUTPATIENT
Start: 2022-09-24 | End: 2022-09-24

## 2022-09-24 RX ADMIN — KETOROLAC TROMETHAMINE 15 MG: 30 INJECTION, SOLUTION INTRAMUSCULAR at 01:09

## 2022-09-24 RX ADMIN — SODIUM CHLORIDE 1000 ML: 0.9 INJECTION, SOLUTION INTRAVENOUS at 12:09

## 2022-09-24 RX ADMIN — ONDANSETRON HYDROCHLORIDE 4 MG: 2 SOLUTION INTRAMUSCULAR; INTRAVENOUS at 01:09

## 2022-09-24 NOTE — ED PROVIDER NOTES
Encounter Date: 9/24/2022  This note is dictated on M*Modal word recognition program.  There are word recognition mistakes and grammatical errors that are occasionally missed on review.     [facility]                                                  Chief Complaint  66 y.o. male with Abdominal Pain and Flank Pain    History of Present Illness  Juan Young presents to the emergency room with complaints of right lower quadrant abdominal pain and right flank pain.  Patient reports the pain has been present on and off for about a week now.  However today after riding his long more the pain to his right lower quadrant became very sharp and radiated to his right flank.  Patient denies any trouble urinating or hematuria.  Patient describes the pain as very sharp and rates the pain 8/10 currently. patient does endorse nausea but no constipation or diarrhea this time.    Past Medical History:   Diagnosis Date    Anticoagulant long-term use     Arthritis     CAD S/P percutaneous coronary angioplasty 01/26/2018    Heart palpitations     History of pneumonectomy     Hyperlipidemia     Hypertension     Myocardial infarction 2013    stents x 1    Sleep apnea     NO CPAP- COULD NOT AFFORD     Past Surgical History:   Procedure Laterality Date    CARDIAC ANGIOGRAM WITH STENTS      X 2    EXCISION OF HYDROCELE Left 8/23/2022    Procedure: HYDROCELECTOMY;  Surgeon: Omkar Newman MD;  Location: [facility] OR;  Service: Urology;  Laterality: Left;  Around 10:30am per Revee    LEFT HEART CATHETERIZATION Left 3/23/2021    Procedure: Left heart cath;  Surgeon: Ranjeet Estrada MD;  Location: [facility] CATH;  Service: Cardiology;  Laterality: Left;    left knee surgery      LUNG REMOVAL, TOTAL Left     lung removed  Left Lung Removed     tumor removed from left lung        Review of patient's allergies indicates:  No Known Allergies     Review of Systems and Physical Exam     Review of Systems  -- Constitution - no fever,  no weight loss, no loss of consciousness  -- Eyes - no changes in vision, no redness, no swelling  -- Ear, Nose - no  earache, denies congestion  -- Mouth,Throat - no sore throat, no toothache, normal voice, normal swallowing  -- Respiratory - denies cough and congestion, no shortness of breath, no wheezing  -- Cardiovascular - denies chest pain, no palpitations,   -- Gastrointestinal - RLQ abdominal pain, with nausea, but no vomiting, and diarrhea  -- Genitourinary - no dysuria, reports right flank pain, no hematuria or frequency   -- Musculoskeletal - denies back pain, negative for myalgias and arthralgias   -- Neurological - no headache, no neurologic changes, no loss of bladder or bowel function no seizure like activity, no changes in hearing or vision  -- Skin - denies skin changes, no rash, no hives, no suspected skin infection    Vital Signs   weight is 114.5 kg (252 lb 8.6 oz). His oral temperature is 98.5 °F (36.9 °C). His blood pressure is 157/78 (abnormal) and his pulse is 64. His respiration is 20 and oxygen saturation is 98%.      Physical Exam  -- Nursing note and vitals reviewed  -- Constitutional:  Awake alert and oriented, GCS 15, no acute distress.  Appears well.  -- Head: Atraumatic. Normocephalic. No obvious abnormality  -- Eyes: Pupils are equal and reactive to light. Extraocular movements intact. No nystagmus.  No periorbital swelling. Normal conjunctiva.  -- Nose: Nose grossly normal in appearance, nares grossly normal. No rhinorrhea.  -- Throat: Mucous membranes moist, pharynx normal, normal tonsils.  Airway patent.  -- Ears: External ears and TM normal bilaterally. Normal hearing.   -- Neck: Normal range of motion. Neck supple. No meningismus. No adenopathy  -- Cardiac: Normal rate, regular rhythm and normal heart sounds. No carotid bruit. No lower extremity edema.  -- Pulmonary: Normal respiratory effort, breath sounds equal bilaterally. Adequate flow.  No wheezing.  No crackles.  --  Abdominal: Soft, right lower quadrant tenderness upon palpation today no guarding, no rebound. Normal bowel sounds.   -- Musculoskeletal: Normal range of motion, all 4 extremities 5/5 strength.  Neurovascularly intact. Atraumatic. No deformities.  -- Neurological:  Cranial nerves 2-12 grossly intact. No focal deficits.   -- Vascular: Posterior tibial, dorsalis pedis and radial pulses 2+ bilaterally    -- Lymphatics: No cervical or peripheral lymphadenopathy.   -- Skin: Warm and dry. No evidence of rash or cellulitis  -- Psychiatric: Normal mood and affect. Bedside behavior is appropriate.  Patient is cooperative.  Denies suicidal homicidal ideation.    Emergency Room Course     Treatment Course, Evaluation, and Medical Decision Making    Abnormal lab values  Labs Reviewed   CBC W/ AUTO DIFFERENTIAL - Abnormal; Notable for the following components:       Result Value    RBC 4.45 (*)     MCH 31.9 (*)     Immature Grans (Abs) 0.05 (*)     Gran % 76.9 (*)     Lymph % 13.6 (*)     All other components within normal limits   COMPREHENSIVE METABOLIC PANEL - Abnormal; Notable for the following components:    Glucose 115 (*)     All other components within normal limits   URINALYSIS, REFLEX TO URINE CULTURE - Abnormal; Notable for the following components:    Specific Gravity, UA >=1.030 (*)     Protein, UA 1+ (*)     Occult Blood UA 3+ (*)     All other components within normal limits    Narrative:     Specimen Source->Urine   URINALYSIS MICROSCOPIC - Abnormal; Notable for the following components:    RBC, UA 30 (*)     All other components within normal limits    Narrative:     Specimen Source->Urine   LIPASE       Medications Given  Medications   oxyCODONE-acetaminophen 5-325 mg per tablet 1 tablet (has no administration in time range)   sodium chloride 0.9% bolus 1,000 mL (0 mLs Intravenous Stopped 9/24/22 1415)   ketorolac injection 15 mg (15 mg Intravenous Given 9/24/22 1314)   ondansetron injection 4 mg (4 mg  Intravenous Given 9/24/22 3944)         Diagnosis  -- The encounter diagnosis was Kidney stone.    Disposition and Plan  -- Disposition: home  -- Condition: stable  -- Follow-up: Patient to follow up with Soren Pineda MD in 1-2 days, and any specialists noted on discharge paperwork  -- I advised the patient that we have found no life threatening condition today  -- At this time, I believe the patient is clinically stable for discharge.   -- The patient acknowledges that close follow up with a MD is required   -- Patient agrees to comply with all instruction and direction given in the ER  -- Patient counseled on strict return precautions as discussed         ED Course   Procedures  Labs Reviewed   CBC W/ AUTO DIFFERENTIAL - Abnormal; Notable for the following components:       Result Value    RBC 4.45 (*)     MCH 31.9 (*)     Immature Grans (Abs) 0.05 (*)     Gran % 76.9 (*)     Lymph % 13.6 (*)     All other components within normal limits   COMPREHENSIVE METABOLIC PANEL - Abnormal; Notable for the following components:    Glucose 115 (*)     All other components within normal limits   URINALYSIS, REFLEX TO URINE CULTURE - Abnormal; Notable for the following components:    Specific Gravity, UA >=1.030 (*)     Protein, UA 1+ (*)     Occult Blood UA 3+ (*)     All other components within normal limits    Narrative:     Specimen Source->Urine   URINALYSIS MICROSCOPIC - Abnormal; Notable for the following components:    RBC, UA 30 (*)     All other components within normal limits    Narrative:     Specimen Source->Urine   LIPASE          Imaging Results              CT Renal Stone Study ABD Pelvis WO (Final result)  Result time 09/24/22 13:24:49      Final result by Roland Knapp Jr., MD (09/24/22 13:24:49)                   Impression:      8 mm right UPJ stone with hydronephrosis.  Atherosclerotic calcification of the renal vessels at both ishaan.  4 cm cyst at the lower pole of the left kidney and 1.4 cm cyst  at the upper pole.  Prior left pneumonectomy.      Electronically signed by: Roland Knapp MD  Date:    09/24/2022  Time:    13:24               Narrative:    EXAMINATION:  CT RENAL STONE STUDY ABD PELVIS WO    CLINICAL HISTORY:  Flank pain, kidney stone suspected;    TECHNIQUE:  Low dose axial images, sagittal and coronal reformations were obtained from the lung bases to the pubic symphysis.  Contrast was not administered.    COMPARISON:  CT abdomen of June 5, 2015    FINDINGS:  The patient has had a left pneumonectomy with hyperexpansion of the right lung and shift of the heart into the left hemithorax.    The liver is of normal size contour and CT density without focal mass.  The gallbladder is absent with surgical clips noted.  The pancreas is of normal contour and CT density without edema or mass.  The spleen is of normal size and CT density.  Two small accessory spleens are noted.    The adrenal glands are not enlarged the right kidney is enlarged with hydronephrosis.  There is a 8 mm UPJ stone.  Distal to the stone the ureter follows a normal course down to the bladder without dilation or distal stone.  There is atherosclerotic calcification noted in the renal vessels in both ishaan.  On the left there is a 4 cm cyst at the lower pole calyx.  There is a 1.4 cm cyst at the upper pole.  Hydronephrosis or stone on the left is not seen.  The abdominal aorta and inferior vena cava are of normal caliber.    The stomach is of normal configuration.  Small bowel dilatation or air-fluid levels are not seen.  There is diverticulosis noted of the cecum, transverse, descending and sigmoid colon without CT evidence of diverticulitis.  A normal appendix is seen.  No free fluid or free air is noted.    The bladder is of normal contour without mass or asymmetry.  The prostate is not enlarged.                                       Medications   oxyCODONE-acetaminophen 5-325 mg per tablet 1 tablet (has no administration in  time range)   sodium chloride 0.9% bolus 1,000 mL (0 mLs Intravenous Stopped 9/24/22 1415)   ketorolac injection 15 mg (15 mg Intravenous Given 9/24/22 1314)   ondansetron injection 4 mg (4 mg Intravenous Given 9/24/22 1344)     Medical Decision Making:   Differential Diagnosis:   Appendicitis, renal stone, diverticulitis, colitis  ED Management:  CT of abdomen pelvis reveals  8 mm right UPJ stone with hydronephrosis.  Atherosclerotic calcification of the renal vessels at both ishaan.  4 cm cyst at the lower pole of the left kidney and 1.4 cm cyst at the upper pole.  Prior left pneumonectomy.  Patient instructed to follow-up with urology or return to ER with any worsening or concerning symptoms.    Patient was placed on Flomax, Norco, Mobic, and Zofran to help with pain,nausea,and passing of renal stone.  Patient stable at time of discharge.  Unable to give patient IV narcotics in ER for pain control due to him not having a ride home.                        Clinical Impression:   Final diagnoses:  [N20.0] Kidney stone (Primary)      ED Disposition Condition    Discharge Stable          ED Prescriptions       Medication Sig Dispense Start Date End Date Auth. Provider    tamsulosin (FLOMAX) 0.4 mg Cap Take 1 capsule (0.4 mg total) by mouth once daily. 30 capsule 9/24/2022 10/24/2022 Crow Chavira NP    HYDROcodone-acetaminophen (NORCO) 5-325 mg per tablet Take 1 tablet by mouth every 8 (eight) hours as needed for Pain. 15 tablet 9/24/2022 9/29/2022 Crow Chavira NP    meloxicam (MOBIC) 7.5 MG tablet Take 1 tablet (7.5 mg total) by mouth once daily. for 5 days 5 tablet 9/24/2022 9/29/2022 Crow Chavira NP          Follow-up Information       Follow up With Specialties Details Why Contact [facility]  Schedule an appointment as soon as possible for a visit in 1 day  [address] [place] [id number]  [phone]               Crow Chavira NP  09/24/22 8609

## 2022-09-24 NOTE — ED TRIAGE NOTES
C/o right lower abdominal pain radiating to right lower back with nausea since 0900. Patient reports pain began after riding a riding lawnmower. Patient reports had left testicular surgery 1 month ago by Dr. Newman.

## 2022-09-27 PROBLEM — N20.1 URETERAL STONE: Status: ACTIVE | Noted: 2022-09-27

## 2022-10-03 ENCOUNTER — PATIENT OUTREACH (OUTPATIENT)
Dept: ADMINISTRATIVE | Facility: HOSPITAL | Age: 67
End: 2022-10-03
Payer: MEDICARE

## 2022-10-25 ENCOUNTER — PATIENT OUTREACH (OUTPATIENT)
Dept: ADMINISTRATIVE | Facility: HOSPITAL | Age: 67
End: 2022-10-25
Payer: MEDICARE

## 2022-10-28 ENCOUNTER — PATIENT OUTREACH (OUTPATIENT)
Dept: ADMINISTRATIVE | Facility: HOSPITAL | Age: 67
End: 2022-10-28
Payer: MEDICARE

## 2022-10-28 NOTE — PROGRESS NOTES
Chart reviewed, immunization record updated.  No new results noted on [facility] or [facility] web site.  Care Everywhere updated.   Patient care coordination note updated.  Next PCP visit 1/11/2023.  FitKit was given to patient on 10/28/2022 11:08 AM (as requested per SG)

## 2023-05-24 ENCOUNTER — PATIENT OUTREACH (OUTPATIENT)
Dept: ADMINISTRATIVE | Facility: HOSPITAL | Age: 68
End: 2023-05-24
Payer: MEDICARE

## 2023-05-24 DIAGNOSIS — E78.5 HYPERLIPIDEMIA, UNSPECIFIED HYPERLIPIDEMIA TYPE: Primary | ICD-10-CM

## 2023-05-24 DIAGNOSIS — I25.10 CORONARY ARTERY DISEASE, UNSPECIFIED VESSEL OR LESION TYPE, UNSPECIFIED WHETHER ANGINA PRESENT, UNSPECIFIED WHETHER NATIVE OR TRANSPLANTED HEART: ICD-10-CM

## 2023-05-24 RX ORDER — ATORVASTATIN CALCIUM 80 MG/1
40 TABLET, FILM COATED ORAL DAILY
Qty: 90 TABLET | Refills: 0 | Status: SHIPPED | OUTPATIENT
Start: 2023-05-24

## 2023-05-24 NOTE — TELEPHONE ENCOUNTER
Care Due:                  Date            Visit Type   Department     Provider  --------------------------------------------------------------------------------                                EP -                              PRIMARY      [facility]  Last Visit: 07-      CARE ([facility])   MEDICINE MILAD Pineda  Next Visit: None Scheduled  None         None Found                                                            Last  Test          Frequency    Reason                     Performed    Due Date  --------------------------------------------------------------------------------    Office Visit  12 months..  atorvastatin.............  07- 07-    Lipid Panel.  12 months..  atorvastatin.............  02- 05-    [employer] Embedded Care Due Messages. Reference number: [id number].   5/24/2023 11:57:46 AM CDT

## 2023-05-24 NOTE — PROGRESS NOTES
Chart reviewed, immunization record updated.  No new results noted on [facility] or [facility] web site.  Care Everywhere updated.   Patient care coordination note updated.   LOV with PCP 07/08/2022.  Contacted patient to discuss Statin therapy and Colorectal cancer screening.    Patient prescribed Atorvastatin 80mg daily, patient states he is taking Atorvastain 40mg daily.  Message sent to provider for review and medication refill.   Medication list updated.   Patient agrees to complete Fit Kit.  FitKit was given to patient on 5/24/2023 12:07 PM

## 2023-07-26 ENCOUNTER — TELEPHONE (OUTPATIENT)
Dept: INTERNAL MEDICINE | Facility: CLINIC | Age: 68
End: 2023-07-26
Payer: MEDICARE

## 2023-07-26 NOTE — TELEPHONE ENCOUNTER
Spoke to pt has been scheduled for Tuesday, notified if any worsening symptoms go to urgent care or ER.

## 2023-07-26 NOTE — TELEPHONE ENCOUNTER
----- Message from Skye Pena sent at 2023  3:14 PM CDT -----  Contact: pt  Juan Young  MRN: [id number]  : 1955  PCP: Soren Pineda  Home Phone      [phone]  Work Phone      Not on file.  Mobile          [phone]      MESSAGE:     Pt states he is not feeling good and is dizzy. Pt would like an appt with Miriam, Prefers Dr. Pineda.       Please advise  [phone]

## 2023-08-01 ENCOUNTER — TELEPHONE (OUTPATIENT)
Dept: INTERNAL MEDICINE | Facility: CLINIC | Age: 68
End: 2023-08-01

## 2023-08-01 ENCOUNTER — OFFICE VISIT (OUTPATIENT)
Dept: INTERNAL MEDICINE | Facility: CLINIC | Age: 68
End: 2023-08-01
Payer: MEDICARE

## 2023-08-01 VITALS
RESPIRATION RATE: 19 BRPM | HEIGHT: 76 IN | HEART RATE: 67 BPM | BODY MASS INDEX: 30.55 KG/M2 | WEIGHT: 250.88 LBS | SYSTOLIC BLOOD PRESSURE: 124 MMHG | DIASTOLIC BLOOD PRESSURE: 80 MMHG | OXYGEN SATURATION: 97 %

## 2023-08-01 DIAGNOSIS — Z12.5 SCREENING PSA (PROSTATE SPECIFIC ANTIGEN): ICD-10-CM

## 2023-08-01 DIAGNOSIS — J44.1 CHRONIC OBSTRUCTIVE PULMONARY DISEASE WITH (ACUTE) EXACERBATION: ICD-10-CM

## 2023-08-01 DIAGNOSIS — I70.0 AORTIC ATHEROSCLEROSIS: ICD-10-CM

## 2023-08-01 DIAGNOSIS — G47.33 OBSTRUCTIVE SLEEP APNEA (ADULT) (PEDIATRIC): ICD-10-CM

## 2023-08-01 DIAGNOSIS — R42 VERTIGO: Primary | ICD-10-CM

## 2023-08-01 DIAGNOSIS — E66.9 OBESITY (BMI 30.0-34.9): ICD-10-CM

## 2023-08-01 PROCEDURE — 3074F PR MOST RECENT SYSTOLIC BLOOD PRESSURE < 130 MM HG: ICD-10-PCS | Mod: CPTII,S$GLB,, | Performed by: INTERNAL MEDICINE

## 2023-08-01 PROCEDURE — 3074F SYST BP LT 130 MM HG: CPT | Mod: CPTII,S$GLB,, | Performed by: INTERNAL MEDICINE

## 2023-08-01 PROCEDURE — 1159F MED LIST DOCD IN RCRD: CPT | Mod: CPTII,S$GLB,, | Performed by: INTERNAL MEDICINE

## 2023-08-01 PROCEDURE — 4010F ACE/ARB THERAPY RXD/TAKEN: CPT | Mod: CPTII,S$GLB,, | Performed by: INTERNAL MEDICINE

## 2023-08-01 PROCEDURE — 99999 PR PBB SHADOW E&M-EST. PATIENT-LVL IV: CPT | Mod: PBBFAC,,, | Performed by: INTERNAL MEDICINE

## 2023-08-01 PROCEDURE — 3079F DIAST BP 80-89 MM HG: CPT | Mod: CPTII,S$GLB,, | Performed by: INTERNAL MEDICINE

## 2023-08-01 PROCEDURE — 3008F PR BODY MASS INDEX (BMI) DOCUMENTED: ICD-10-PCS | Mod: CPTII,S$GLB,, | Performed by: INTERNAL MEDICINE

## 2023-08-01 PROCEDURE — 1160F PR REVIEW ALL MEDS BY PRESCRIBER/CLIN PHARMACIST DOCUMENTED: ICD-10-PCS | Mod: CPTII,S$GLB,, | Performed by: INTERNAL MEDICINE

## 2023-08-01 PROCEDURE — 1159F PR MEDICATION LIST DOCUMENTED IN MEDICAL RECORD: ICD-10-PCS | Mod: CPTII,S$GLB,, | Performed by: INTERNAL MEDICINE

## 2023-08-01 PROCEDURE — 99213 PR OFFICE/OUTPT VISIT, EST, LEVL III, 20-29 MIN: ICD-10-PCS | Mod: S$GLB,,, | Performed by: INTERNAL MEDICINE

## 2023-08-01 PROCEDURE — 1126F AMNT PAIN NOTED NONE PRSNT: CPT | Mod: CPTII,S$GLB,, | Performed by: INTERNAL MEDICINE

## 2023-08-01 PROCEDURE — 1101F PR PT FALLS ASSESS DOC 0-1 FALLS W/OUT INJ PAST YR: ICD-10-PCS | Mod: CPTII,S$GLB,, | Performed by: INTERNAL MEDICINE

## 2023-08-01 PROCEDURE — 4010F PR ACE/ARB THEARPY RXD/TAKEN: ICD-10-PCS | Mod: CPTII,S$GLB,, | Performed by: INTERNAL MEDICINE

## 2023-08-01 PROCEDURE — 99999 PR PBB SHADOW E&M-EST. PATIENT-LVL IV: ICD-10-PCS | Mod: PBBFAC,,, | Performed by: INTERNAL MEDICINE

## 2023-08-01 PROCEDURE — 1126F PR PAIN SEVERITY QUANTIFIED, NO PAIN PRESENT: ICD-10-PCS | Mod: CPTII,S$GLB,, | Performed by: INTERNAL MEDICINE

## 2023-08-01 PROCEDURE — 1101F PT FALLS ASSESS-DOCD LE1/YR: CPT | Mod: CPTII,S$GLB,, | Performed by: INTERNAL MEDICINE

## 2023-08-01 PROCEDURE — 3288F PR FALLS RISK ASSESSMENT DOCUMENTED: ICD-10-PCS | Mod: CPTII,S$GLB,, | Performed by: INTERNAL MEDICINE

## 2023-08-01 PROCEDURE — 3288F FALL RISK ASSESSMENT DOCD: CPT | Mod: CPTII,S$GLB,, | Performed by: INTERNAL MEDICINE

## 2023-08-01 PROCEDURE — 1160F RVW MEDS BY RX/DR IN RCRD: CPT | Mod: CPTII,S$GLB,, | Performed by: INTERNAL MEDICINE

## 2023-08-01 PROCEDURE — 3008F BODY MASS INDEX DOCD: CPT | Mod: CPTII,S$GLB,, | Performed by: INTERNAL MEDICINE

## 2023-08-01 PROCEDURE — 99213 OFFICE O/P EST LOW 20 MIN: CPT | Mod: S$GLB,,, | Performed by: INTERNAL MEDICINE

## 2023-08-01 PROCEDURE — 3079F PR MOST RECENT DIASTOLIC BLOOD PRESSURE 80-89 MM HG: ICD-10-PCS | Mod: CPTII,S$GLB,, | Performed by: INTERNAL MEDICINE

## 2023-08-01 RX ORDER — MECLIZINE HYDROCHLORIDE 25 MG/1
25 TABLET ORAL 3 TIMES DAILY PRN
Qty: 30 TABLET | Refills: 0 | Status: SHIPPED | OUTPATIENT
Start: 2023-08-01 | End: 2023-09-18

## 2023-08-01 NOTE — TELEPHONE ENCOUNTER
Sleep study ordered. Orders faxed to M along with clinic notes, insurance and demographic information.

## 2023-08-01 NOTE — PROGRESS NOTES
"HPI:  Juan Young is a 67 y.o. male here for Dizziness  .  C/o dizziness today  Occurs intermittently throughout the day.   Worsening - states has been occurring for years   Lasting anywhere from secs to hrs   States occasionally worsens when looking up after he has been looking down    Denies any vision changes  Endorses fatigue, lightheadedness, diplopia  Denies n/v/f/c, night sweats, weight loss, abdominal pain, no changes to bowels, urinary symptoms, CP, SOB, cough    Presented to ED 7/27 c/o dizziness and lightheadedness  MRI - no acute findings  Discharged with meclizine and zofran    Requesting CPAP. Previously did sleep study and dx with RENEE  Will get another sleep study     Labs reviewed     Review of Systems   Constitutional:  Negative for chills and fever.   HENT:  Negative for congestion, postnasal drip and sore throat.    Eyes:  Negative for photophobia.   Respiratory:  Negative for chest tightness and shortness of breath.    Cardiovascular:  Negative for chest pain.   Gastrointestinal:  Negative for abdominal distention, abdominal pain, blood in stool and vomiting.   Genitourinary:  Negative for dysuria, flank pain and hematuria.   Musculoskeletal:  Negative for back pain.   Skin:  Negative for pallor.   Neurological:  Positive for dizziness and light-headedness. Negative for seizures, facial asymmetry, speech difficulty, numbness and headaches.   Hematological:  Does not bruise/bleed easily.   Psychiatric/Behavioral:  Negative for agitation and suicidal ideas. The patient is not nervous/anxious.       A review of systems was performed and is negative except as noted above.    Objective:  Vitals:    08/01/23 0844   BP: 124/80   Pulse: 67   Resp: 19   SpO2: 97%   Weight: 113.8 kg (250 lb 14.1 oz)   Height: 6' 4" (1.93 m)      Physical Exam  Vitals and nursing note reviewed.   Constitutional:       Appearance: He is well-developed.   HENT:      Head: Normocephalic and atraumatic.      Nose: Nose " normal.   Eyes:      Conjunctiva/sclera: Conjunctivae normal.      Pupils: Pupils are equal, round, and reactive to light.   Neck:      Thyroid: No thyromegaly.      Vascular: No JVD.   Cardiovascular:      Rate and Rhythm: Normal rate and regular rhythm.      Heart sounds: Normal heart sounds.   Pulmonary:      Effort: Pulmonary effort is normal.      Breath sounds: Normal breath sounds.   Abdominal:      General: Bowel sounds are normal. There is no distension.      Palpations: Abdomen is soft. There is no mass.      Tenderness: There is no abdominal tenderness. There is no guarding.   Musculoskeletal:         General: Normal range of motion.      Cervical back: Normal range of motion and neck supple.   Lymphadenopathy:      Cervical: No cervical adenopathy.   Skin:     General: Skin is warm and dry.      Coloration: Skin is not pale.      Findings: No rash.   Neurological:      Mental Status: He is alert and oriented to person, place, and time.      Cranial Nerves: Cranial nerve deficit (diplopia) present.      Deep Tendon Reflexes: Reflexes are normal and symmetric.          Assessment/Plan:  1. Vertigo  -     meclizine (ANTIVERT) 25 mg tablet; Take 1 tablet (25 mg total) by mouth 3 (three) times daily as needed for Dizziness.  Dispense: 30 tablet; Refill: 0  -     Ambulatory referral/consult to Neurology; Future; Expected date: 08/08/2023    2. Aortic atherosclerosis  -     CBC Auto Differential; Future; Expected date: 01/28/2024  -     Comprehensive Metabolic Panel; Future; Expected date: 01/28/2024  -     Lipid Panel; Future; Expected date: 01/28/2024    3. Chronic obstructive pulmonary disease with (acute) exacerbation  -     CBC Auto Differential; Future; Expected date: 01/28/2024  -     Comprehensive Metabolic Panel; Future; Expected date: 01/28/2024  -     Lipid Panel; Future; Expected date: 01/28/2024    4. Obesity (BMI 30.0-34.9)  -     Polysomnogram (CPAP will be added if patient meets diagnostic  criteria.); Future  -     CBC Auto Differential; Future; Expected date: 01/28/2024  -     Comprehensive Metabolic Panel; Future; Expected date: 01/28/2024  -     Lipid Panel; Future; Expected date: 01/28/2024    5. Obstructive sleep apnea (adult) (pediatric)  -     Polysomnogram (CPAP will be added if patient meets diagnostic criteria.); Future    6. Screening PSA (prostate specific antigen)  -     PSA, Screening; Future; Expected date: 01/28/2024

## 2023-08-04 ENCOUNTER — HOSPITAL ENCOUNTER (OUTPATIENT)
Dept: SLEEP MEDICINE | Facility: HOSPITAL | Age: 68
Discharge: HOME | End: 2023-08-04
Attending: INTERNAL MEDICINE
Payer: MEDICARE

## 2023-08-04 DIAGNOSIS — G47.33 OBSTRUCTIVE SLEEP APNEA (ADULT) (PEDIATRIC): ICD-10-CM

## 2023-08-04 DIAGNOSIS — E66.9 OBESITY (BMI 30.0-34.9): ICD-10-CM

## 2023-08-04 PROCEDURE — 95810 POLYSOM 6/> YRS 4/> PARAM: CPT

## 2023-09-05 ENCOUNTER — OFFICE VISIT (OUTPATIENT)
Dept: INTERNAL MEDICINE | Facility: CLINIC | Age: 68
End: 2023-09-05
Payer: MEDICARE

## 2023-09-05 VITALS
HEART RATE: 66 BPM | DIASTOLIC BLOOD PRESSURE: 72 MMHG | WEIGHT: 252.63 LBS | BODY MASS INDEX: 30.76 KG/M2 | SYSTOLIC BLOOD PRESSURE: 124 MMHG | RESPIRATION RATE: 20 BRPM | HEIGHT: 76 IN

## 2023-09-05 DIAGNOSIS — J02.9 SORE THROAT: ICD-10-CM

## 2023-09-05 DIAGNOSIS — I25.10 CORONARY ARTERY DISEASE, UNSPECIFIED VESSEL OR LESION TYPE, UNSPECIFIED WHETHER ANGINA PRESENT, UNSPECIFIED WHETHER NATIVE OR TRANSPLANTED HEART: ICD-10-CM

## 2023-09-05 DIAGNOSIS — J06.9 UPPER RESPIRATORY TRACT INFECTION, UNSPECIFIED TYPE: Primary | ICD-10-CM

## 2023-09-05 DIAGNOSIS — I10 PRIMARY HYPERTENSION: ICD-10-CM

## 2023-09-05 PROCEDURE — 99999 PR PBB SHADOW E&M-EST. PATIENT-LVL IV: CPT | Mod: PBBFAC,,, | Performed by: NURSE PRACTITIONER

## 2023-09-05 PROCEDURE — 1125F AMNT PAIN NOTED PAIN PRSNT: CPT | Mod: CPTII,S$GLB,, | Performed by: NURSE PRACTITIONER

## 2023-09-05 PROCEDURE — 4010F PR ACE/ARB THEARPY RXD/TAKEN: ICD-10-PCS | Mod: CPTII,S$GLB,, | Performed by: NURSE PRACTITIONER

## 2023-09-05 PROCEDURE — 1125F PR PAIN SEVERITY QUANTIFIED, PAIN PRESENT: ICD-10-PCS | Mod: CPTII,S$GLB,, | Performed by: NURSE PRACTITIONER

## 2023-09-05 PROCEDURE — 3074F PR MOST RECENT SYSTOLIC BLOOD PRESSURE < 130 MM HG: ICD-10-PCS | Mod: CPTII,S$GLB,, | Performed by: NURSE PRACTITIONER

## 2023-09-05 PROCEDURE — 3078F PR MOST RECENT DIASTOLIC BLOOD PRESSURE < 80 MM HG: ICD-10-PCS | Mod: CPTII,S$GLB,, | Performed by: NURSE PRACTITIONER

## 2023-09-05 PROCEDURE — 3078F DIAST BP <80 MM HG: CPT | Mod: CPTII,S$GLB,, | Performed by: NURSE PRACTITIONER

## 2023-09-05 PROCEDURE — 4010F ACE/ARB THERAPY RXD/TAKEN: CPT | Mod: CPTII,S$GLB,, | Performed by: NURSE PRACTITIONER

## 2023-09-05 PROCEDURE — 1159F PR MEDICATION LIST DOCUMENTED IN MEDICAL RECORD: ICD-10-PCS | Mod: CPTII,S$GLB,, | Performed by: NURSE PRACTITIONER

## 2023-09-05 PROCEDURE — 3074F SYST BP LT 130 MM HG: CPT | Mod: CPTII,S$GLB,, | Performed by: NURSE PRACTITIONER

## 2023-09-05 PROCEDURE — 99213 OFFICE O/P EST LOW 20 MIN: CPT | Mod: S$GLB,,, | Performed by: NURSE PRACTITIONER

## 2023-09-05 PROCEDURE — 99213 PR OFFICE/OUTPT VISIT, EST, LEVL III, 20-29 MIN: ICD-10-PCS | Mod: S$GLB,,, | Performed by: NURSE PRACTITIONER

## 2023-09-05 PROCEDURE — 1159F MED LIST DOCD IN RCRD: CPT | Mod: CPTII,S$GLB,, | Performed by: NURSE PRACTITIONER

## 2023-09-05 PROCEDURE — 3008F BODY MASS INDEX DOCD: CPT | Mod: CPTII,S$GLB,, | Performed by: NURSE PRACTITIONER

## 2023-09-05 PROCEDURE — 3008F PR BODY MASS INDEX (BMI) DOCUMENTED: ICD-10-PCS | Mod: CPTII,S$GLB,, | Performed by: NURSE PRACTITIONER

## 2023-09-05 PROCEDURE — 99999 PR PBB SHADOW E&M-EST. PATIENT-LVL IV: ICD-10-PCS | Mod: PBBFAC,,, | Performed by: NURSE PRACTITIONER

## 2023-09-05 RX ORDER — FLUTICASONE PROPIONATE 50 MCG
1 SPRAY, SUSPENSION (ML) NASAL DAILY
Qty: 16 G | Refills: 0 | Status: SHIPPED | OUTPATIENT
Start: 2023-09-05 | End: 2024-03-11

## 2023-09-05 RX ORDER — NITROGLYCERIN 0.4 MG/1
0.4 TABLET SUBLINGUAL
COMMUNITY
Start: 2022-08-11

## 2023-09-05 RX ORDER — LORATADINE 10 MG/1
10 TABLET ORAL DAILY
Qty: 14 TABLET | Refills: 0 | Status: SHIPPED | OUTPATIENT
Start: 2023-09-05 | End: 2023-09-18

## 2023-09-05 RX ORDER — VALSARTAN 160 MG/1
160 TABLET ORAL DAILY
COMMUNITY
Start: 2023-07-31

## 2023-09-05 NOTE — PROGRESS NOTES
Subjective:           Patient ID: Juan Young is a 67 y.o. male.    Chief Complaint: Nasal Congestion and Headache    Juan Young is a 67 y.o. male  acute visit, Known hx of CAD s/p PCI ,  [facility] last  March 2021 and told all stents patent   Notes Hx of  dizziness since he is 20.     Here today with 2 days of congestion and headache       Headache   This is a new problem. The current episode started in the past 7 days. Associated symptoms include a sore throat. Pertinent negatives include no abdominal pain, coughing, dizziness, ear pain, nausea, sinus pressure, vomiting or weakness.     Review of Systems   Constitutional: Negative.    HENT:  Positive for congestion, postnasal drip and sore throat. Negative for ear pain, sinus pressure and sneezing.    Eyes: Negative.    Respiratory:  Negative for cough, chest tightness, shortness of breath and wheezing.    Cardiovascular: Negative.  Negative for chest pain.   Gastrointestinal:  Negative for abdominal pain, blood in stool, constipation, diarrhea, nausea and vomiting.   Genitourinary:  Negative for difficulty urinating, dysuria and flank pain.   Musculoskeletal: Negative.  Negative for joint swelling.   Skin: Negative.    Neurological:  Positive for headaches. Negative for dizziness and weakness.   Hematological: Negative.    Psychiatric/Behavioral: Negative.  Negative for behavioral problems and confusion.        Objective:      Physical Exam  Vitals and nursing note reviewed.   Constitutional:       General: He is not in acute distress.     Appearance: He is well-developed. He is obese. He is not ill-appearing, toxic-appearing or diaphoretic.   HENT:      Head: Normocephalic and atraumatic.      Right Ear: A middle ear effusion is present.      Nose: Mucosal edema and congestion present.      Mouth/Throat:      Comments: No significant erythema to throat but post histamine streaks, cobblestoning noted   Eyes:      Conjunctiva/sclera: Conjunctivae normal.       Pupils: Pupils are equal, round, and reactive to light.   Neck:      Thyroid: No thyromegaly.      Vascular: No JVD.   Cardiovascular:      Rate and Rhythm: Normal rate and regular rhythm.      Heart sounds: Normal heart sounds.   Pulmonary:      Effort: Pulmonary effort is normal.      Breath sounds: Normal breath sounds.   Abdominal:      General: Bowel sounds are normal. There is no distension.      Palpations: Abdomen is soft. There is no mass.      Tenderness: There is no abdominal tenderness. There is no guarding.   Musculoskeletal:         General: No swelling or tenderness. Normal range of motion.      Cervical back: Normal range of motion and neck supple.   Lymphadenopathy:      Cervical: No cervical adenopathy.   Skin:     General: Skin is warm and dry.      Coloration: Skin is not pale.      Findings: No rash.   Neurological:      Mental Status: He is alert and oriented to person, place, and time.      Cranial Nerves: No cranial nerve deficit.      Deep Tendon Reflexes: Reflexes are normal and symmetric.         Assessment:       1. Upper respiratory tract infection, unspecified type    2. Sore throat    3. Coronary artery disease, unspecified vessel or lesion type, unspecified whether angina present, unspecified whether native or transplanted heart    4. Primary hypertension        Plan:   1. Upper respiratory tract infection, unspecified type  -     fluticasone propionate (FLONASE) 50 mcg/actuation nasal spray; 1 spray (50 mcg total) by Each Nostril route once daily. Use daily in am for nasal congestion and post nasal drip  Dispense: 16 g; Refill: 0    2. Sore throat  -     loratadine (CLARITIN) 10 mg tablet; Take 1 tablet (10 mg total) by mouth once daily. Take daily in am as needed for post nasal drip, allergies for 14 days  Dispense: 14 tablet; Refill: 0    3. Coronary artery disease, unspecified vessel or lesion type, unspecified whether angina present, unspecified whether native or transplanted  heart  Overview:  3/23/[facility]21 [facility]  Left ventricular end diastolic pressure was 22 mmHg.  Left ventricular function:   Moderate global hypokinesis  LM:   Normal.  LAD:  Ostial 20%.  Proximal stent widely patent.  Mid vessel 60-70% lesion.  CFx:  Proximal 30% lesion.  Mid vessel stent widely patent.  RCA:  Ostial 40%.  Mid vessel stent widely patent.  Mid vessel 30% lesion.  Distal stent widely patent with mild distal disease    Aspirin   plavix  Atorvastatin     Assessment & Plan:  Stable with Rx       4. Primary hypertension  Overview:  Chronic  Noted since 2012  Valsartan       Assessment & Plan:  Stable with Rx

## 2023-09-18 ENCOUNTER — LAB VISIT (OUTPATIENT)
Dept: LAB | Facility: HOSPITAL | Age: 68
End: 2023-09-18
Attending: PSYCHIATRY & NEUROLOGY
Payer: MEDICARE

## 2023-09-18 ENCOUNTER — OFFICE VISIT (OUTPATIENT)
Dept: NEUROLOGY | Facility: CLINIC | Age: 68
End: 2023-09-18
Payer: MEDICARE

## 2023-09-18 VITALS
WEIGHT: 253.5 LBS | HEIGHT: 76 IN | HEART RATE: 68 BPM | SYSTOLIC BLOOD PRESSURE: 152 MMHG | RESPIRATION RATE: 16 BRPM | DIASTOLIC BLOOD PRESSURE: 80 MMHG | BODY MASS INDEX: 30.87 KG/M2

## 2023-09-18 DIAGNOSIS — R42 DIZZINESS: ICD-10-CM

## 2023-09-18 DIAGNOSIS — H53.2 TRANSIENT DIPLOPIA: ICD-10-CM

## 2023-09-18 LAB
CK SERPL-CCNC: 247 U/L (ref 20–200)
T4 FREE SERPL-MCNC: 0.83 NG/DL (ref 0.71–1.51)
TSH SERPL DL<=0.005 MIU/L-ACNC: 4.61 UIU/ML (ref 0.4–4)

## 2023-09-18 PROCEDURE — 4010F PR ACE/ARB THEARPY RXD/TAKEN: ICD-10-PCS | Mod: CPTII,S$GLB,, | Performed by: PSYCHIATRY & NEUROLOGY

## 2023-09-18 PROCEDURE — 1160F PR REVIEW ALL MEDS BY PRESCRIBER/CLIN PHARMACIST DOCUMENTED: ICD-10-PCS | Mod: CPTII,S$GLB,, | Performed by: PSYCHIATRY & NEUROLOGY

## 2023-09-18 PROCEDURE — 1125F PR PAIN SEVERITY QUANTIFIED, PAIN PRESENT: ICD-10-PCS | Mod: CPTII,S$GLB,, | Performed by: PSYCHIATRY & NEUROLOGY

## 2023-09-18 PROCEDURE — 99999 PR PBB SHADOW E&M-EST. PATIENT-LVL III: CPT | Mod: PBBFAC,,, | Performed by: PSYCHIATRY & NEUROLOGY

## 2023-09-18 PROCEDURE — 3079F DIAST BP 80-89 MM HG: CPT | Mod: CPTII,S$GLB,, | Performed by: PSYCHIATRY & NEUROLOGY

## 2023-09-18 PROCEDURE — 4010F ACE/ARB THERAPY RXD/TAKEN: CPT | Mod: CPTII,S$GLB,, | Performed by: PSYCHIATRY & NEUROLOGY

## 2023-09-18 PROCEDURE — 99999 PR PBB SHADOW E&M-EST. PATIENT-LVL III: ICD-10-PCS | Mod: PBBFAC,,, | Performed by: PSYCHIATRY & NEUROLOGY

## 2023-09-18 PROCEDURE — 82550 ASSAY OF CK (CPK): CPT | Performed by: PSYCHIATRY & NEUROLOGY

## 2023-09-18 PROCEDURE — 36415 COLL VENOUS BLD VENIPUNCTURE: CPT | Performed by: PSYCHIATRY & NEUROLOGY

## 2023-09-18 PROCEDURE — 1159F PR MEDICATION LIST DOCUMENTED IN MEDICAL RECORD: ICD-10-PCS | Mod: CPTII,S$GLB,, | Performed by: PSYCHIATRY & NEUROLOGY

## 2023-09-18 PROCEDURE — 1159F MED LIST DOCD IN RCRD: CPT | Mod: CPTII,S$GLB,, | Performed by: PSYCHIATRY & NEUROLOGY

## 2023-09-18 PROCEDURE — 3077F SYST BP >= 140 MM HG: CPT | Mod: CPTII,S$GLB,, | Performed by: PSYCHIATRY & NEUROLOGY

## 2023-09-18 PROCEDURE — 83519 RIA NONANTIBODY: CPT | Performed by: PSYCHIATRY & NEUROLOGY

## 2023-09-18 PROCEDURE — 84443 ASSAY THYROID STIM HORMONE: CPT | Performed by: PSYCHIATRY & NEUROLOGY

## 2023-09-18 PROCEDURE — 1160F RVW MEDS BY RX/DR IN RCRD: CPT | Mod: CPTII,S$GLB,, | Performed by: PSYCHIATRY & NEUROLOGY

## 2023-09-18 PROCEDURE — 86596 VOLTAGE-GTD CA CHNL ANTB EA: CPT | Performed by: PSYCHIATRY & NEUROLOGY

## 2023-09-18 PROCEDURE — 83519 RIA NONANTIBODY: CPT | Mod: 59 | Performed by: PSYCHIATRY & NEUROLOGY

## 2023-09-18 PROCEDURE — 3008F BODY MASS INDEX DOCD: CPT | Mod: CPTII,S$GLB,, | Performed by: PSYCHIATRY & NEUROLOGY

## 2023-09-18 PROCEDURE — 99204 PR OFFICE/OUTPT VISIT, NEW, LEVL IV, 45-59 MIN: ICD-10-PCS | Mod: S$GLB,,, | Performed by: PSYCHIATRY & NEUROLOGY

## 2023-09-18 PROCEDURE — 3079F PR MOST RECENT DIASTOLIC BLOOD PRESSURE 80-89 MM HG: ICD-10-PCS | Mod: CPTII,S$GLB,, | Performed by: PSYCHIATRY & NEUROLOGY

## 2023-09-18 PROCEDURE — 1125F AMNT PAIN NOTED PAIN PRSNT: CPT | Mod: CPTII,S$GLB,, | Performed by: PSYCHIATRY & NEUROLOGY

## 2023-09-18 PROCEDURE — 99204 OFFICE O/P NEW MOD 45 MIN: CPT | Mod: S$GLB,,, | Performed by: PSYCHIATRY & NEUROLOGY

## 2023-09-18 PROCEDURE — 3008F PR BODY MASS INDEX (BMI) DOCUMENTED: ICD-10-PCS | Mod: CPTII,S$GLB,, | Performed by: PSYCHIATRY & NEUROLOGY

## 2023-09-18 PROCEDURE — 3077F PR MOST RECENT SYSTOLIC BLOOD PRESSURE >= 140 MM HG: ICD-10-PCS | Mod: CPTII,S$GLB,, | Performed by: PSYCHIATRY & NEUROLOGY

## 2023-09-18 PROCEDURE — 84439 ASSAY OF FREE THYROXINE: CPT | Performed by: PSYCHIATRY & NEUROLOGY

## 2023-09-18 NOTE — PROGRESS NOTES
Consult from Dr. Pineda    HPI: Juan Young is a 67 y.o. male who  presents for evaluation of dizziness. The symptoms started  years ago.   He feels first in his 20s    He wakes without dizziness. He feels symptoms with movement/ activity on some mornings.Feels a floating sensation  He feels double vision as well even in his youth  He notes side by side double vision    Not large amounts of vertigo and not disabling. NO light headedness.     He currently feels diplopia sitting in the exam room.     Denies ptosis    Saw an optometrist and no abnormalities found but he needed corrective lenses.     Never had PT for dizziness    No weakness in the arms or legs.     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   HENT:  Negative for nosebleeds.    Eyes:  Negative for double vision.   Respiratory:  Negative for hemoptysis.    Cardiovascular:  Negative for leg swelling.   Gastrointestinal:  Negative for blood in stool.   Genitourinary:  Negative for hematuria.   Musculoskeletal:  Negative for falls.   Skin:  Negative for rash.   Neurological:  Positive for dizziness.   Endo/Heme/Allergies:  Does not bruise/bleed easily.         I have reviewed all of this patient's past medical and surgical histories as well as family and social histories and active allergies and medications as documented in the electronic medical record.          Exam:  Gen Appearance, well developed/nourished in no apparent distress  CV: 2+ distal pulses with no edema or swelling  Neuro:  MS: Awake, alert, oriented to place, person, time, situation. Sustains attention. Recent/remote memory intact, Language is full to spontaneous speech/repetition/naming/comprehension. Fund of Knowledge is full  CN: Optic discs are flat with  normal vasculature, PERRL, Extraoccular movements and visual fields are full ? Double vision looking up Normal facial sensation and strength, Hearing symmetric, Tongue and Palate are midline and strong. Shoulder Shrug symmetric and strong.  Motor: Normal bulk, tone, no abnormal movements. 5/5 strength bilateral upper/lower extremities with 2+ reflexes and no clonus  Sensory: symmetric to light touch, pain, temp, and vibration/proprioception. Romberg negative  Cerebellar: Finger-nose,Heal-shin, Rapid alternating movements intact  Gait: Normal stance, no ataxia    Imagin2023 MRI brain: No evidence of an acute intracranial abnormality.     Nonspecific patchy areas of increased T2/FLAIR signal within the bilateral periventricular and subcortical white matter, possibly the result of chronic microvascular ischemic change or some other demyelinating process.       Labs:  CMP, CBC normal      Assessment/Plan: Juan Young is a 67 y.o. male with several years of dizziness and floating sensation at times. However, he also has diplopia with this  I recommend:     2023 MRI brain: reassuring  2.   Concerning is his prominent diplopia complaint. Check: TSH, CK, Myasthenia gravis panel.   3.   If this is negative, will refer to PT  for vestibular rehab  -Antivert has not been helpful  4. Note his history of  left lung removal for benign tumor remotely     RTC 3 months  CC:  Dr. Pineda

## 2023-09-27 LAB
ACHR BIND AB SER-SCNC: 0 NMOL/L
VGCC-N BIND AB SER-SCNC: NORMAL PMOL/L
VGCC-P/Q BIND AB SER-SCNC: 0 NMOL/L

## 2023-09-28 DIAGNOSIS — R42 DIZZINESS: Primary | ICD-10-CM

## 2023-09-28 LAB — MUSK ANTIBODY TEST: 0 NMOL/L (ref 0–0.02)

## 2023-12-08 ENCOUNTER — PATIENT OUTREACH (OUTPATIENT)
Dept: ADMINISTRATIVE | Facility: HOSPITAL | Age: 68
End: 2023-12-08
Payer: MEDICARE

## 2023-12-08 DIAGNOSIS — Z12.12 ENCOUNTER FOR COLORECTAL CANCER SCREENING: Primary | ICD-10-CM

## 2023-12-08 DIAGNOSIS — Z12.11 ENCOUNTER FOR COLORECTAL CANCER SCREENING: Primary | ICD-10-CM

## 2024-01-01 NOTE — TELEPHONE ENCOUNTER
patient asking if the dehydration could be the reason he is feeling bad. Patient bp is also a little elevated. See pressures in previous message. Please advise.    no Ambulatory

## 2024-01-05 ENCOUNTER — PATIENT OUTREACH (OUTPATIENT)
Dept: ADMINISTRATIVE | Facility: HOSPITAL | Age: 69
End: 2024-01-05
Payer: MEDICARE

## 2024-01-05 ENCOUNTER — PATIENT MESSAGE (OUTPATIENT)
Dept: ADMINISTRATIVE | Facility: HOSPITAL | Age: 69
End: 2024-01-05
Payer: MEDICARE

## 2024-01-05 DIAGNOSIS — Z59.41 FOOD INSECURITY: Primary | ICD-10-CM

## 2024-01-05 DIAGNOSIS — Z13.1 DIABETES MELLITUS SCREENING: ICD-10-CM

## 2024-01-05 DIAGNOSIS — Z11.59 NEED FOR HEPATITIS C SCREENING TEST: ICD-10-CM

## 2024-01-05 SDOH — SOCIAL DETERMINANTS OF HEALTH (SDOH): FOOD INSECURITY: Z59.41

## 2024-01-05 NOTE — PROGRESS NOTES
LPN Care Coordination Encounter Details:    Outreach Performed: YES Telephone Successful       Recent ED and/or IP Discharges:    Last PCP Visit Date: 2023          [x]  Reviewed recent ED & Inpatient Discharges to ensure appropriate           follow up appointments are scheduled         Additional Notes:             Provider Team Continuity:        [x]  Reviewed Primary Care Provider Visits, Annual Wellness Visit, and Future          Appointments to ensure appointments have been scheduled and/or           completed        Additional Notes:    NOV: 2024.  eAWV: 02/15/2024.         [employer] Portal Status:         [x]  Reviewed [employer] Portal Status offered / enrolled if applicable        Additional Notes:     [employer] Outcomes: Pt is enrolled & active            Health Maintenance Screening(s) Due:      Additional [facility] Notes:                  Updates Requested / Reviewed:        Updated Care Coordination Note, Care Everywhere, , External Sources: [employer] and [employer], Care Team Updated, Removed  or Duplicate Orders, and Immunizations Reconciliation Completed or Queried: [place]         Health Maintenance Topics Overdue:      VBHM Score: 3     Colon Cancer Screening  Uncontrolled BP  Hemoglobin A1c                Health Maintenance Topic(s) Outreach Outcomes & Actions Taken:    Enhanced Annual Wellness Visit.  Contacted patient, patient scheduled eAWV for 02/15/2024.     Blood Pressure - Outreach Outcomes & Actions Taken  : Patient Will Call Back or Send Portal Message with Reading    Colorectal Cancer Screening - Outreach Outcomes & Actions Taken  : Reminded Patient to Complete Already Received Home Test    Diabetes Screening - HgbA1c lab order placed, Linked to lab appointment scheduled for 2024.    Lab screening - Lab order placed for Hepatitis C screening, Linked to appointment scheduled for 2024.          Chronic Disease Management:     Diabetes Measures      No  "results found for: "LABA1C", "HGBA1C"        [x]  Reviewed chart for active Diabetes diagnosis     []  Scheduled necessary follow up appointments if needed         Additional Notes:      No dx for Diabetes.  Lab order placed for HgbA1c (Diabetes Screening)            Order linked to lab appointment scheduled for 01/30/2024.         Hypertension Measures        BP Readings from Last 1 Encounters:   09/18/23 (!) 152/80           [x]  Reviewed chart for active Hypertension diagnosis     []  Reviewed & documented Home BP Cuff     []  Documented a Remote BP if needed & applicable     []  Scheduled necessary follow up appointments with Primary Care if needed         Additional Notes:  Patient states he does have a B/P machine at home.    Will have family member obtain remote reading and send in through portal.  Portal message reminder to obtain B/P sent to patient.          Social Determinants of Health         [x]  Reviewed, completed, and/or updated the following sections:                  Food Insecurity, Transportation Needs, Financial Resource Strain,                 Tobacco Use            Care Management, Digital Medicine, and/or Education Referrals    OPCM Risk Score: 46         Next Steps - Referral Actions: Message sent to Tia Turner (CHW) for food insecurity.        Additional Notes:                "

## 2024-01-23 LAB
CHOLEST SERPL-MSCNC: 157 MG/DL (ref 0–200)
CHOLEST/HDLC SERPL: 4.4 {RATIO}
HDLC SERPL-MCNC: 36 MG/DL (ref 35–70)
LDL CHOLESTEROL DIRECT: 94 MG/DL
NON HDL CHOL. (LDL+VLDL): 121
TRIGL SERPL-MCNC: 156 MG/DL (ref 40–160)
VLDL CHOLESTEROL: 31 MG/DL

## 2024-01-30 ENCOUNTER — PATIENT OUTREACH (OUTPATIENT)
Dept: ADMINISTRATIVE | Facility: OTHER | Age: 69
End: 2024-01-30
Payer: MEDICARE

## 2024-01-30 NOTE — PROGRESS NOTES
CHW - Case Closure    This Community Health Worker spoke to patient via telephone today.     Pt/Caregiver reported: pt stated he actually heard about  [facility] and how they help with energy bills but pt didn't know about the meals on wheels and senior center meals    Pt stated he will go in person to [facility] tomorrow to inquire about what he could get assistance with    Pt/Caregiver denied any additional needs at this time and agrees with episode closure at this time.  Provided patient with Community Health Worker's contact information and encouraged him/her to contact this Community Health Worker if additional needs arise.

## 2024-01-30 NOTE — PROGRESS NOTES
CHW - Outreach Attempt    Community Health Worker left a voicemail message for 1st attempt to contact patient regarding: food insecurity    Community Health Worker to attempt to contact patient on: 1/30/24

## 2024-02-15 PROBLEM — J44.1 CHRONIC OBSTRUCTIVE PULMONARY DISEASE WITH (ACUTE) EXACERBATION: Status: RESOLVED | Noted: 2023-08-01 | Resolved: 2024-02-15

## 2024-02-15 PROBLEM — J44.9 COPD (CHRONIC OBSTRUCTIVE PULMONARY DISEASE): Status: ACTIVE | Noted: 2024-02-15

## 2024-03-07 ENCOUNTER — LAB VISIT (OUTPATIENT)
Dept: LAB | Facility: HOSPITAL | Age: 69
End: 2024-03-07
Attending: INTERNAL MEDICINE
Payer: MEDICARE

## 2024-03-07 DIAGNOSIS — I70.0 AORTIC ATHEROSCLEROSIS: ICD-10-CM

## 2024-03-07 DIAGNOSIS — E66.9 OBESITY (BMI 30.0-34.9): ICD-10-CM

## 2024-03-07 DIAGNOSIS — J44.1 CHRONIC OBSTRUCTIVE PULMONARY DISEASE WITH (ACUTE) EXACERBATION: ICD-10-CM

## 2024-03-07 DIAGNOSIS — Z11.59 NEED FOR HEPATITIS C SCREENING TEST: ICD-10-CM

## 2024-03-07 DIAGNOSIS — Z12.5 SCREENING PSA (PROSTATE SPECIFIC ANTIGEN): ICD-10-CM

## 2024-03-07 DIAGNOSIS — Z13.1 DIABETES MELLITUS SCREENING: ICD-10-CM

## 2024-03-07 LAB
ALBUMIN SERPL BCP-MCNC: 3.8 G/DL (ref 3.5–5.2)
ALP SERPL-CCNC: 106 U/L (ref 55–135)
ALT SERPL W/O P-5'-P-CCNC: 28 U/L (ref 10–44)
ANION GAP SERPL CALC-SCNC: 11 MMOL/L (ref 8–16)
AST SERPL-CCNC: 19 U/L (ref 10–40)
BASOPHILS # BLD AUTO: 0.06 K/UL (ref 0–0.2)
BASOPHILS NFR BLD: 1 % (ref 0–1.9)
BILIRUB SERPL-MCNC: 0.8 MG/DL (ref 0.1–1)
BUN SERPL-MCNC: 14 MG/DL (ref 8–23)
CALCIUM SERPL-MCNC: 9.1 MG/DL (ref 8.7–10.5)
CHLORIDE SERPL-SCNC: 106 MMOL/L (ref 95–110)
CHOLEST SERPL-MCNC: 145 MG/DL (ref 120–199)
CHOLEST/HDLC SERPL: 4.5 {RATIO} (ref 2–5)
CO2 SERPL-SCNC: 24 MMOL/L (ref 23–29)
COMPLEXED PSA SERPL-MCNC: 0.74 NG/ML (ref 0–4)
CREAT SERPL-MCNC: 0.8 MG/DL (ref 0.5–1.4)
DIFFERENTIAL METHOD BLD: NORMAL
EOSINOPHIL # BLD AUTO: 0.2 K/UL (ref 0–0.5)
EOSINOPHIL NFR BLD: 2.6 % (ref 0–8)
ERYTHROCYTE [DISTWIDTH] IN BLOOD BY AUTOMATED COUNT: 12.4 % (ref 11.5–14.5)
EST. GFR  (NO RACE VARIABLE): >60 ML/MIN/1.73 M^2
ESTIMATED AVG GLUCOSE: 111 MG/DL (ref 68–131)
GLUCOSE SERPL-MCNC: 125 MG/DL (ref 70–110)
HBA1C MFR BLD: 5.5 % (ref 4–5.6)
HCT VFR BLD AUTO: 42.1 % (ref 40–54)
HCV AB SERPL QL IA: NORMAL
HDLC SERPL-MCNC: 32 MG/DL (ref 40–75)
HDLC SERPL: 22.1 % (ref 20–50)
HGB BLD-MCNC: 14.7 G/DL (ref 14–18)
IMM GRANULOCYTES # BLD AUTO: 0.01 K/UL (ref 0–0.04)
IMM GRANULOCYTES NFR BLD AUTO: 0.2 % (ref 0–0.5)
LDLC SERPL CALC-MCNC: 90.4 MG/DL (ref 63–159)
LYMPHOCYTES # BLD AUTO: 1.7 K/UL (ref 1–4.8)
LYMPHOCYTES NFR BLD: 28.4 % (ref 18–48)
MCH RBC QN AUTO: 30.9 PG (ref 27–31)
MCHC RBC AUTO-ENTMCNC: 34.9 G/DL (ref 32–36)
MCV RBC AUTO: 89 FL (ref 82–98)
MONOCYTES # BLD AUTO: 0.5 K/UL (ref 0.3–1)
MONOCYTES NFR BLD: 8.5 % (ref 4–15)
NEUTROPHILS # BLD AUTO: 3.5 K/UL (ref 1.8–7.7)
NEUTROPHILS NFR BLD: 59.3 % (ref 38–73)
NONHDLC SERPL-MCNC: 113 MG/DL
NRBC BLD-RTO: 0 /100 WBC
PLATELET # BLD AUTO: 214 K/UL (ref 150–450)
PMV BLD AUTO: 10.3 FL (ref 9.2–12.9)
POTASSIUM SERPL-SCNC: 4.4 MMOL/L (ref 3.5–5.1)
PROT SERPL-MCNC: 7 G/DL (ref 6–8.4)
RBC # BLD AUTO: 4.75 M/UL (ref 4.6–6.2)
SODIUM SERPL-SCNC: 141 MMOL/L (ref 136–145)
TRIGL SERPL-MCNC: 113 MG/DL (ref 30–150)
WBC # BLD AUTO: 5.85 K/UL (ref 3.9–12.7)

## 2024-03-07 PROCEDURE — 83036 HEMOGLOBIN GLYCOSYLATED A1C: CPT | Performed by: INTERNAL MEDICINE

## 2024-03-07 PROCEDURE — 85025 COMPLETE CBC W/AUTO DIFF WBC: CPT | Performed by: INTERNAL MEDICINE

## 2024-03-07 PROCEDURE — 36415 COLL VENOUS BLD VENIPUNCTURE: CPT | Performed by: INTERNAL MEDICINE

## 2024-03-07 PROCEDURE — 80061 LIPID PANEL: CPT | Performed by: INTERNAL MEDICINE

## 2024-03-07 PROCEDURE — 80053 COMPREHEN METABOLIC PANEL: CPT | Performed by: INTERNAL MEDICINE

## 2024-03-07 PROCEDURE — 86803 HEPATITIS C AB TEST: CPT | Performed by: INTERNAL MEDICINE

## 2024-03-07 PROCEDURE — 84153 ASSAY OF PSA TOTAL: CPT | Performed by: INTERNAL MEDICINE

## 2024-03-11 ENCOUNTER — PATIENT OUTREACH (OUTPATIENT)
Dept: ADMINISTRATIVE | Facility: HOSPITAL | Age: 69
End: 2024-03-11
Payer: MEDICARE

## 2024-03-11 ENCOUNTER — OFFICE VISIT (OUTPATIENT)
Dept: INTERNAL MEDICINE | Facility: CLINIC | Age: 69
End: 2024-03-11
Payer: MEDICARE

## 2024-03-11 VITALS
SYSTOLIC BLOOD PRESSURE: 138 MMHG | WEIGHT: 260.38 LBS | DIASTOLIC BLOOD PRESSURE: 80 MMHG | HEART RATE: 75 BPM | RESPIRATION RATE: 19 BRPM | OXYGEN SATURATION: 98 % | BODY MASS INDEX: 31.71 KG/M2 | HEIGHT: 76 IN

## 2024-03-11 DIAGNOSIS — R79.89 ABNORMAL TSH: ICD-10-CM

## 2024-03-11 DIAGNOSIS — I10 PRIMARY HYPERTENSION: ICD-10-CM

## 2024-03-11 DIAGNOSIS — I25.10 CORONARY ARTERY DISEASE, UNSPECIFIED VESSEL OR LESION TYPE, UNSPECIFIED WHETHER ANGINA PRESENT, UNSPECIFIED WHETHER NATIVE OR TRANSPLANTED HEART: Primary | ICD-10-CM

## 2024-03-11 DIAGNOSIS — R73.01 IMPAIRED FASTING BLOOD SUGAR: ICD-10-CM

## 2024-03-11 DIAGNOSIS — E78.5 HYPERLIPIDEMIA, UNSPECIFIED HYPERLIPIDEMIA TYPE: Chronic | ICD-10-CM

## 2024-03-11 DIAGNOSIS — I20.9 ANGINA, CLASS III: ICD-10-CM

## 2024-03-11 DIAGNOSIS — Z12.11 SPECIAL SCREENING FOR MALIGNANT NEOPLASMS, COLON: ICD-10-CM

## 2024-03-11 DIAGNOSIS — I70.0 AORTIC ATHEROSCLEROSIS: ICD-10-CM

## 2024-03-11 PROBLEM — J44.9 COPD (CHRONIC OBSTRUCTIVE PULMONARY DISEASE): Status: RESOLVED | Noted: 2024-02-15 | Resolved: 2024-03-11

## 2024-03-11 PROCEDURE — 99999 PR PBB SHADOW E&M-EST. PATIENT-LVL III: CPT | Mod: PBBFAC,,, | Performed by: INTERNAL MEDICINE

## 2024-03-11 PROCEDURE — 99214 OFFICE O/P EST MOD 30 MIN: CPT | Mod: S$GLB,,, | Performed by: INTERNAL MEDICINE

## 2024-03-11 NOTE — PROGRESS NOTES
"HPI:  Juan Young is a 68 y.o. male here for Follow-up  Labs are reviewed    Review of Systems   Constitutional:  Negative for chills and fever.   HENT:  Negative for congestion, postnasal drip and sore throat.    Eyes:  Negative for photophobia.   Respiratory:  Negative for chest tightness and shortness of breath.    Cardiovascular:  Negative for chest pain.   Gastrointestinal:  Negative for abdominal distention, abdominal pain, blood in stool and vomiting.   Genitourinary:  Negative for dysuria, flank pain and hematuria.   Musculoskeletal:  Negative for back pain.   Skin:  Negative for pallor.   Neurological:  Negative for dizziness, seizures, facial asymmetry, speech difficulty and numbness.   Hematological:  Does not bruise/bleed easily.   Psychiatric/Behavioral:  Negative for agitation and suicidal ideas. The patient is not nervous/anxious.     A review of systems was performed and is negative except as noted above.    Objective:  Vitals:    03/11/24 0835   BP: 138/80   Pulse: 75   Resp: 19   SpO2: 98%   Weight: 118.1 kg (260 lb 5.8 oz)   Height: 6' 4" (1.93 m)      Physical Exam  Vitals and nursing note reviewed.   Constitutional:       Appearance: He is well-developed.   HENT:      Head: Normocephalic and atraumatic.      Nose: Nose normal.   Eyes:      Conjunctiva/sclera: Conjunctivae normal.      Pupils: Pupils are equal, round, and reactive to light.   Neck:      Thyroid: No thyromegaly.      Vascular: No JVD.   Cardiovascular:      Rate and Rhythm: Normal rate and regular rhythm.      Heart sounds: Normal heart sounds.   Pulmonary:      Effort: Pulmonary effort is normal.      Breath sounds: Normal breath sounds.   Abdominal:      General: Bowel sounds are normal. There is no distension.      Palpations: Abdomen is soft. There is no mass.      Tenderness: There is no abdominal tenderness. There is no guarding.   Musculoskeletal:         General: Normal range of motion.      Cervical back: Normal range " of motion and neck supple.   Lymphadenopathy:      Cervical: No cervical adenopathy.   Skin:     General: Skin is warm and dry.      Coloration: Skin is not pale.      Findings: No rash.   Neurological:      Mental Status: He is alert and oriented to person, place, and time.      Cranial Nerves: No cranial nerve deficit.      Deep Tendon Reflexes: Reflexes are normal and symmetric.        Assessment/Plan:  1. Coronary artery disease, unspecified vessel or lesion type, unspecified whether angina present, unspecified whether native or transplanted heart  Overview:  CAD s/p PCI ,   3/23/2021 [facility]  Left ventricular end diastolic pressure was 22 mmHg.  Left ventricular function:   Moderate global hypokinesis  LM:   Normal.  LAD:  Ostial 20%.  Proximal stent widely patent.  Mid vessel 60-70% lesion.  CFx:  Proximal 30% lesion.  Mid vessel stent widely patent.  RCA:  Ostial 40%.  Mid vessel stent widely patent.  Mid vessel 30% lesion.  Distal stent widely patent with mild distal disease    Aspirin   plavix  Atorvastatin   Metoprolol  NTG     NP ; labs were reviewed ;stable; continue same for now        2. Angina, class III  No longer an issue   Continue meds.  Not using nitro     3. Aortic atherosclerosis  Overview:  1/1/13 CTA of chest- Calcified atheromatous disease affects the aorta and    its main branch vessels.     Ecotrin  Lipitor  Plavix       4. Primary hypertension  Overview:  Chronic  Noted since 2012  Amlodipine   Valsartan   Metoprolol         5. Hyperlipidemia, unspecified hyperlipidemia type  Overview:  Chronic  Noted since 2013   Atorvastatin       6. Abnormal TSH  I offered him levothyroxine   He will wait till next bw       Impaired fasting blood sugar  -     HEMOGLOBIN A1C; Future  Low carb diet     Special screening for malignant neoplasms, colon  -     Case Request Endoscopy: COLONOSCOPY

## 2024-04-01 ENCOUNTER — CLINICAL SUPPORT (OUTPATIENT)
Dept: FAMILY MEDICINE | Facility: CLINIC | Age: 69
End: 2024-04-01
Payer: MEDICARE

## 2024-04-01 ENCOUNTER — OFFICE VISIT (OUTPATIENT)
Dept: FAMILY MEDICINE | Facility: CLINIC | Age: 69
End: 2024-04-01
Payer: MEDICARE

## 2024-04-01 VITALS
WEIGHT: 265.19 LBS | RESPIRATION RATE: 17 BRPM | OXYGEN SATURATION: 96 % | BODY MASS INDEX: 32.29 KG/M2 | HEIGHT: 76 IN | SYSTOLIC BLOOD PRESSURE: 148 MMHG | TEMPERATURE: 98 F | DIASTOLIC BLOOD PRESSURE: 74 MMHG | HEART RATE: 86 BPM

## 2024-04-01 DIAGNOSIS — J02.9 SORE THROAT: Primary | ICD-10-CM

## 2024-04-01 DIAGNOSIS — R05.9 COUGH, UNSPECIFIED TYPE: ICD-10-CM

## 2024-04-01 LAB
CTP QC/QA: YES
S PYO RRNA THROAT QL PROBE: NEGATIVE

## 2024-04-01 PROCEDURE — 99999 PR PBB SHADOW E&M-EST. PATIENT-LVL III: CPT | Mod: PBBFAC,,, | Performed by: FAMILY MEDICINE

## 2024-04-01 PROCEDURE — 36415 COLL VENOUS BLD VENIPUNCTURE: CPT | Mod: S$GLB,,, | Performed by: FAMILY MEDICINE

## 2024-04-01 PROCEDURE — 87880 STREP A ASSAY W/OPTIC: CPT | Mod: QW,S$GLB,, | Performed by: FAMILY MEDICINE

## 2024-04-01 PROCEDURE — 99213 OFFICE O/P EST LOW 20 MIN: CPT | Mod: S$GLB,,, | Performed by: FAMILY MEDICINE

## 2024-04-01 RX ORDER — BENZONATATE 200 MG/1
200 CAPSULE ORAL 3 TIMES DAILY PRN
Qty: 30 CAPSULE | Refills: 1 | Status: SHIPPED | OUTPATIENT
Start: 2024-04-01 | End: 2024-04-11

## 2024-04-01 RX ORDER — PROMETHAZINE HYDROCHLORIDE AND DEXTROMETHORPHAN HYDROBROMIDE 6.25; 15 MG/5ML; MG/5ML
5 SYRUP ORAL EVERY 6 HOURS PRN
Qty: 180 ML | Refills: 0 | Status: SHIPPED | OUTPATIENT
Start: 2024-04-01 | End: 2024-04-11

## 2024-04-01 NOTE — PROGRESS NOTES
Subjective:       Patient ID: Juan Young is a 68 y.o. male.    Chief Complaint: Sore Throat and Cough (Pt here for sore throat and cough. )    Pt is a 68 y.o. male who presents for evaluation and management of   Encounter Diagnoses   Name Primary?    Sore throat Yes    Cough, unspecified type    .1 week   White sputum   Itchy throat     Doing well on current meds. Denies any side effects. Prevention is up to date.    Review of Systems   Constitutional:  Negative for fever.   HENT:  Positive for sore throat.    Respiratory:  Positive for cough.        Objective:      Physical Exam  Constitutional:       Appearance: Normal appearance. He is well-developed. He is not ill-appearing.   HENT:      Head: Normocephalic and atraumatic.      Right Ear: External ear normal.      Left Ear: External ear normal.      Nose: Nose normal.      Mouth/Throat:      Mouth: Mucous membranes are moist.      Pharynx: No oropharyngeal exudate or posterior oropharyngeal erythema.   Eyes:      General: No scleral icterus.        Right eye: No discharge.         Left eye: No discharge.      Conjunctiva/sclera: Conjunctivae normal.      Pupils: Pupils are equal, round, and reactive to light.   Neck:      Thyroid: No thyromegaly.      Vascular: No JVD.      Trachea: No tracheal deviation.   Cardiovascular:      Rate and Rhythm: Normal rate and regular rhythm.      Heart sounds: Normal heart sounds. No murmur heard.  Pulmonary:      Effort: Pulmonary effort is normal. No respiratory distress.      Breath sounds: Normal breath sounds. No wheezing or rales.   Chest:      Chest wall: No tenderness.   Abdominal:      General: Bowel sounds are normal. There is no distension.      Palpations: Abdomen is soft. There is no mass.      Tenderness: There is no abdominal tenderness. There is no guarding or rebound.   Musculoskeletal:         General: Normal range of motion.      Cervical back: Normal range of motion and neck supple.      Right lower leg:  No edema.      Left lower leg: No edema.   Lymphadenopathy:      Cervical: No cervical adenopathy.   Skin:     General: Skin is warm and dry.   Neurological:      Mental Status: He is alert and oriented to person, place, and time.      Cranial Nerves: No cranial nerve deficit.      Motor: No abnormal muscle tone.      Coordination: Coordination normal.      Deep Tendon Reflexes: Reflexes are normal and symmetric. Reflexes normal.   Psychiatric:         Behavior: Behavior normal.         Thought Content: Thought content normal.         Judgment: Judgment normal.         Assessment:       1. Sore throat    2. Cough, unspecified type        Plan:   1. Sore throat  -     POCT Rapid Strep A    2. Cough, unspecified type  -     promethazine-dextromethorphan (PROMETHAZINE-DM) 6.25-15 mg/5 mL Syrp; Take 5 mLs by mouth every 6 (six) hours as needed.  Dispense: 180 mL; Refill: 0  -     benzonatate (TESSALON) 200 MG capsule; Take 1 capsule (200 mg total) by mouth 3 (three) times daily as needed for Cough.  Dispense: 30 capsule; Refill: 1      No follow-ups on file.

## 2024-06-27 LAB
CHOL/HDLC RATIO: 4.3
CHOLEST SERPL-MSCNC: 121 MG/DL (ref 0–200)
CHOLEST/HDLC SERPL: ABNORMAL {RATIO}
HDL/CHOLESTEROL RATIO: ABNORMAL
HDLC SERPL-MCNC: 28 MG/DL (ref 35–70)
LDL CHOLESTEROL DIRECT: ABNORMAL
LDLC SERPL CALC-MCNC: 67 MG/DL
LDLC SERPL CALC-MCNC: 67 MG/DL (ref 0–160)
NONHDLC SERPL-MCNC: 28 MG/DL
TRIGL SERPL-MCNC: 154 MG/DL (ref 40–160)
VLDL CHOLESTEROL: ABNORMAL

## 2024-07-25 ENCOUNTER — PATIENT OUTREACH (OUTPATIENT)
Dept: ADMINISTRATIVE | Facility: HOSPITAL | Age: 69
End: 2024-07-25
Payer: MEDICARE

## 2024-07-25 NOTE — PROGRESS NOTES
[facility], Colorectal Cancer Screening.  Chart reviewed, immunization record updated.  No new results noted on [facility] or [facility] web site.  Care Everywhere updated.   Patient care coordination note  Next PCP visit 9/11/2024 and lab visit on 9/4/2024.  Spoke to patient, requested replacement Fit Kit, mailed to patient.  FitKit was given to patient on 7/25/2024 12:53 PM   Scheduled eAWV for 8/16/2024 with Maritza Coreas NP.

## 2024-08-20 ENCOUNTER — PATIENT OUTREACH (OUTPATIENT)
Dept: ADMINISTRATIVE | Facility: HOSPITAL | Age: 69
End: 2024-08-20
Payer: MEDICARE

## 2024-08-20 NOTE — PROGRESS NOTES
Chart reviewed, immunization record updated.  No new results noted on [facility] or [facility] web site.  Care Everywhere updated.   Patient care coordination note  Upcoming PCP visit updated.  Next PCP visit 09/11/24  Patient verified he received Fit Kit and will try to collect soon.   Spoke to patient, rescheduled eAWV for 8/28/2024.

## 2024-10-11 ENCOUNTER — PATIENT MESSAGE (OUTPATIENT)
Dept: ADMINISTRATIVE | Facility: HOSPITAL | Age: 69
End: 2024-10-11
Payer: MEDICARE

## 2024-11-04 ENCOUNTER — PATIENT OUTREACH (OUTPATIENT)
Dept: ADMINISTRATIVE | Facility: HOSPITAL | Age: 69
End: 2024-11-04
Payer: MEDICARE

## 2024-11-04 NOTE — PROGRESS NOTES
Care Coordination Encounter Details:       MyChart Portal Status:         [x]  Reviewed MyChart Portal Status offered / enrolled if applicable        Additional Notes:     MyChart Outcomes: Pt is enrolled & active          Updates Requested / Reviewed:        Updated Care Coordination Note, Care Everywhere, External Sources: [facility] and [facility], Care Team Updated, Removed  or Duplicate Orders, and Immunizations Reconciliation Completed or Queried: [place]         Health Maintenance Screening(s) Due:      Health Maintenance Topics Overdue:      VBHM Score: 2     Colon Cancer Screening  Uncontrolled BP    Influenza Vaccine  Tetanus Vaccine  Shingles/Zoster Vaccine  RSV Vaccine                  Health Maintenance Topic(s) Outreach Outcomes & Actions Taken:    Blood Pressure - Outreach Outcomes & Actions Taken  : Patient Will Call Back or Send Portal Message with Reading    Colorectal Cancer Screening - Outreach Outcomes & Actions Taken  : Reminded Patient to Complete Already Received Home Test    Primary Care Appt - Outreach Outcomes & Actions Taken  : Patient Declined Scheduling or Will Call Back to Schedule     Additional Notes:             Chronic Disease Management:     Diabetes Measures        Lab Results   Component Value Date    HGBA1C 5.5 2024           [x]  Reviewed chart for active Diabetes diagnosis     []  Scheduled necessary follow up appointments if needed         Additional Notes:             Hypertension Measures        BP Readings from Last 1 Encounters:   24 138/73           [x]  Reviewed chart for active Hypertension diagnosis     []  Reviewed & documented Home BP Cuff     []  Documented a Remote BP if needed & applicable     []  Scheduled necessary follow up appointments with Primary Care if needed         Additional Notes:             Provider Team Continuity:     Last PCP Visit Date: 3/11/2024          [x]  Reviewed Primary Care Provider Visits, Annual Wellness Visit, and  Future          Appointments to ensure appointments have been scheduled and/or           completed        Additional Notes:             Social Determinants of Health          [x]  Reviewed, completed, and/or updated the following sections:                  Food Insecurity, Transportation Needs, Financial Resource Strain,                 Tobacco Use        Additional Notes:             Care Management, Digital Medicine, and/or Education Referrals    OPCM Risk Score: 45.9         Next Steps - Referral Actions: no referral today        Additional Notes:

## 2025-02-04 ENCOUNTER — PATIENT OUTREACH (OUTPATIENT)
Dept: ADMINISTRATIVE | Facility: HOSPITAL | Age: 70
End: 2025-02-04
Payer: MEDICARE

## 2025-02-04 NOTE — PROGRESS NOTES
Care Coordination Encounter Details:       MyChart Portal Status:         [x]  Reviewed MyChart Portal Status offered / enrolled if applicable        Additional Notes:     MyChart Outcomes: Pt is enrolled & active          Updates Requested / Reviewed:        Updated Care Coordination Note, Care Everywhere, , External Sources: [facility] and [facility], Care Team Updated, Removed  or Duplicate Orders, and Immunizations Reconciliation Completed or Queried: [place]         Health Maintenance Screening(s) Due:      Health Maintenance Topics Overdue:      VBHM Score: 2     Colon Cancer Screening  Uncontrolled BP    Influenza Vaccine  Tetanus Vaccine  Shingles/Zoster Vaccine  RSV Vaccine                  Health Maintenance Topic(s) Outreach Outcomes & Actions Taken:    Colorectal Cancer Screening - Outreach Outcomes & Actions Taken  : pt not ready to schedule    Blood Pressure - Outreach Outcomes & Actions Taken  : Patient Will Call Back or Send Portal Message with Reading    Primary Care Appt - Outreach Outcomes & Actions Taken  : Primary Care Appt Scheduled     Additional Notes:   Patientrequested to come in to see provider /scheduled           Chronic Disease Management:     Diabetes Measures        Lab Results   Component Value Date    HGBA1C 5.5 2024           [x]  Reviewed chart for active Diabetes diagnosis     []  Scheduled necessary follow up appointments if needed         Additional Notes:             Hypertension Measures        BP Readings from Last 1 Encounters:   24 138/73           [x]  Reviewed chart for active Hypertension diagnosis     []  Reviewed & documented Home BP Cuff     []  Documented a Remote BP if needed & applicable     []  Scheduled necessary follow up appointments with Primary Care if needed         Additional Notes:             Provider Team Continuity:     Last PCP Visit Date: 3/11/2024          [x]  Reviewed Primary Care Provider Visits, Annual Wellness  Visit, and Future          Appointments to ensure appointments have been scheduled and/or           completed        Additional Notes:             Social Determinants of Health          [x]  Reviewed, completed, and/or updated the following sections:                  Food Insecurity, Transportation Needs, Financial Resource Strain,                 Tobacco Use        Additional Notes:             Care Management, Digital Medicine, and/or Education Referrals    OPCM Risk Score: 41.9                 Additional Notes:

## 2025-03-12 LAB
CHOLEST SERPL-MSCNC: 155 MG/DL (ref 0–200)
CHOLEST/HDLC SERPL: 4 {RATIO}
HDLC SERPL-MCNC: 39 MG/DL (ref 35–70)
LDL CHOLESTEROL DIRECT: 97 MG/DL
NONHDLC SERPL-MCNC: 116 MG/DL
TRIGL SERPL-MCNC: 152 MG/DL (ref 40–160)
VLDL CHOLESTEROL: 30 MG/DL

## 2025-03-24 DIAGNOSIS — Z00.00 ENCOUNTER FOR MEDICARE ANNUAL WELLNESS EXAM: ICD-10-CM

## 2025-03-31 ENCOUNTER — PATIENT MESSAGE (OUTPATIENT)
Dept: ADMINISTRATIVE | Facility: HOSPITAL | Age: 70
End: 2025-03-31
Payer: MEDICARE

## 2025-05-05 ENCOUNTER — OFFICE VISIT (OUTPATIENT)
Dept: INTERNAL MEDICINE | Facility: CLINIC | Age: 70
End: 2025-05-05
Payer: MEDICARE

## 2025-05-05 VITALS
SYSTOLIC BLOOD PRESSURE: 146 MMHG | WEIGHT: 258.63 LBS | DIASTOLIC BLOOD PRESSURE: 70 MMHG | OXYGEN SATURATION: 98 % | RESPIRATION RATE: 19 BRPM | BODY MASS INDEX: 31.49 KG/M2 | HEART RATE: 75 BPM | HEIGHT: 76 IN

## 2025-05-05 DIAGNOSIS — R42 DIZZINESS: Primary | ICD-10-CM

## 2025-05-05 PROCEDURE — 3078F DIAST BP <80 MM HG: CPT | Mod: CPTII,S$GLB,, | Performed by: INTERNAL MEDICINE

## 2025-05-05 PROCEDURE — 3077F SYST BP >= 140 MM HG: CPT | Mod: CPTII,S$GLB,, | Performed by: INTERNAL MEDICINE

## 2025-05-05 PROCEDURE — 1126F AMNT PAIN NOTED NONE PRSNT: CPT | Mod: CPTII,S$GLB,, | Performed by: INTERNAL MEDICINE

## 2025-05-05 PROCEDURE — 1159F MED LIST DOCD IN RCRD: CPT | Mod: CPTII,S$GLB,, | Performed by: INTERNAL MEDICINE

## 2025-05-05 PROCEDURE — 99999 PR PBB SHADOW E&M-EST. PATIENT-LVL III: CPT | Mod: PBBFAC,,, | Performed by: INTERNAL MEDICINE

## 2025-05-05 PROCEDURE — 99213 OFFICE O/P EST LOW 20 MIN: CPT | Mod: S$GLB,,, | Performed by: INTERNAL MEDICINE

## 2025-05-05 PROCEDURE — 3008F BODY MASS INDEX DOCD: CPT | Mod: CPTII,S$GLB,, | Performed by: INTERNAL MEDICINE

## 2025-05-05 PROCEDURE — 4010F ACE/ARB THERAPY RXD/TAKEN: CPT | Mod: CPTII,S$GLB,, | Performed by: INTERNAL MEDICINE

## 2025-05-05 PROCEDURE — 1160F RVW MEDS BY RX/DR IN RCRD: CPT | Mod: CPTII,S$GLB,, | Performed by: INTERNAL MEDICINE

## 2025-05-05 PROCEDURE — G2211 COMPLEX E/M VISIT ADD ON: HCPCS | Mod: S$GLB,,, | Performed by: INTERNAL MEDICINE

## 2025-05-05 RX ORDER — MECLIZINE HYDROCHLORIDE 25 MG/1
25 TABLET ORAL 3 TIMES DAILY PRN
Qty: 30 TABLET | Refills: 0 | Status: SHIPPED | OUTPATIENT
Start: 2025-05-05

## 2025-05-05 NOTE — PROGRESS NOTES
Subjective:   History of Present Illness    CHIEF COMPLAINT:  Juan presents today for dizziness.    HISTORY OF PRESENT ILLNESS:  He reports experiencing constant dizziness for the past three days. He has a history of dizziness since age 20. MRI brain from two years ago showed no acute intracranial abnormality. CT of brain was also performed.    BLOOD PRESSURE:  Home blood pressure measurement this morning was 160/80 with current reading of 146/70.    MEDICATIONS:  Current medications include metoprolol 50mg, amlodipine 5mg, valsartan 160mg, Plavix, cholesterol medication, and nitroglycerin. He reports independently reducing his cholesterol medication dose by half and has been halving some of his other medications.       Review of Systems   Constitutional:  Negative for chills and fever.   HENT:  Negative for congestion, postnasal drip and sore throat.    Eyes:  Negative for photophobia.   Respiratory:  Negative for chest tightness and shortness of breath.    Cardiovascular:  Negative for chest pain.   Gastrointestinal:  Negative for abdominal distention, abdominal pain, blood in stool and vomiting.   Genitourinary:  Negative for dysuria, flank pain and hematuria.   Musculoskeletal:  Negative for back pain.   Skin:  Negative for pallor.   Neurological:  Positive for dizziness. Negative for seizures, facial asymmetry, speech difficulty and numbness.   Hematological:  Does not bruise/bleed easily.   Psychiatric/Behavioral:  Negative for agitation and suicidal ideas. The patient is not nervous/anxious.       Objective:   Physical Exam  Vitals and nursing note reviewed.   Constitutional:       Appearance: He is well-developed.   HENT:      Head: Normocephalic and atraumatic.      Nose: Nose normal.   Eyes:      Conjunctiva/sclera: Conjunctivae normal.      Pupils: Pupils are equal, round, and reactive to light.   Neck:      Thyroid: No thyromegaly.      Vascular: No JVD.   Cardiovascular:      Rate and Rhythm: Normal  rate and regular rhythm.      Heart sounds: Normal heart sounds.   Pulmonary:      Effort: Pulmonary effort is normal.      Breath sounds: Normal breath sounds.   Abdominal:      General: Bowel sounds are normal. There is no distension.      Palpations: Abdomen is soft. There is no mass.      Tenderness: There is no abdominal tenderness. There is no guarding.   Musculoskeletal:         General: Normal range of motion.      Cervical back: Normal range of motion and neck supple.   Lymphadenopathy:      Cervical: No cervical adenopathy.   Skin:     General: Skin is warm and dry.      Coloration: Skin is not pale.      Findings: No rash.   Neurological:      Mental Status: He is alert and oriented to person, place, and time.      Cranial Nerves: No cranial nerve deficit.      Deep Tendon Reflexes: Reflexes are normal and symmetric.        Assessment/Plan:     1. Dizziness  meclizine (ANTIVERT) 25 mg tablet         Assessment & Plan    I10 Essential (primary) hypertension  R42 Dizziness and giddiness  E78.5 Hyperlipidemia, unspecified  Z91.128 Patient's intentional underdosing of medication regimen for other reason    IMPRESSION:  - Patient presenting with dizziness for 3 days, with a history of dizziness for many years.  - Reviewed current medications, including metoprolol, amlodipine, and valsartan for BP management.  - BP readings: 160/80 earlier, currently 146/70 - still elevated.  - MRI and CT brain from 2 years ago showed no acute intracranial abnormalities.  - Performed otoscopic exam: no wax buildup observed.  - Considered inner ear issues as potential cause of dizziness.  - Initiated medication for dizziness management.    HYPERTENSION:  - Monitored the patient's blood pressure, which was 160/80 in the morning and 146/70 at the time of the visit, indicating uncontrolled hypertension.  - Reviewed and continued current medication regimen including metoprolol 50 mg, amlodipine 5 mg, and valsartan 160 mg for  "hypertension management.    DIZZINESS:  - Evaluated patient's reports of dizziness for 3 days, with episodic occurrences since age 20.  - Dizziness occurs consistently whether sitting or standing and can remit for 2-3 days before recurring.  - Noted previous MRI and CT of the brain from 2 years ago showed no evidence of acute intracranial abnormality.  - Examined the patient's ears for cerumen impaction.  - Explained that dizziness can sometimes be related to inner ear problems affecting balance, and discussed the importance of brain imaging in ruling out serious causes.  - Prescribed medication for dizziness management.    HYPERLIPIDEMIA:  - Assessed that the patient reports cutting cholesterol medication in half to "feel better." Juan has been self-adjusting the dose to half the prescribed amount.    MEDICATION UNDERDOSING:  - Documented that the patient intentionally reduces dosages of multiple medications, including both antihypertensives and lipid-lowering agents.        "

## 2025-05-05 NOTE — LETTER
AUTHORIZATION FOR RELEASE OF   CONFIDENTIAL INFORMATION    Dear Dr. Estrada,    We are seeing Juan Young, date of birth 1955, in the clinic at [facility]. Soren Pineda MD is the patient's PCP. Juan Young has authorized us to request the following medical record(s):        (  )  MAMMOGRAM                                      (  )  COLONOSCOPY      (  )  PAP SMEAR                                          ( X )  OUTSIDE LAB RESULTS     (  )  DEXA SCAN                                          (  )  EYE EXAM            (  )  FOOT EXAM                                          (  )  ENTIRE RECORD     (  )  OUTSIDE IMMUNIZATIONS                 (  )  _______________         Please fax records to [facility], Nawaz, Mohammad Q., MD, [phone]     If you have any questions, please contact [place] at [phone].           Patient Name: Juan Young  : 1955  Patient Phone #: [phone]

## 2025-05-12 ENCOUNTER — PATIENT MESSAGE (OUTPATIENT)
Dept: ADMINISTRATIVE | Facility: HOSPITAL | Age: 70
End: 2025-05-12
Payer: MEDICARE

## 2025-06-05 ENCOUNTER — PATIENT OUTREACH (OUTPATIENT)
Dept: ADMINISTRATIVE | Facility: HOSPITAL | Age: 70
End: 2025-06-05
Payer: MEDICARE

## 2025-06-09 ENCOUNTER — PATIENT MESSAGE (OUTPATIENT)
Dept: ADMINISTRATIVE | Facility: HOSPITAL | Age: 70
End: 2025-06-09
Payer: MEDICARE

## 2025-06-11 DIAGNOSIS — I10 HTN (HYPERTENSION): ICD-10-CM

## 2025-08-08 ENCOUNTER — PATIENT OUTREACH (OUTPATIENT)
Dept: ADMINISTRATIVE | Facility: HOSPITAL | Age: 70
End: 2025-08-08
Payer: MEDICARE

## 2025-08-08 NOTE — PROGRESS NOTES
Portal active: No  Chart reviewed, immunization record updated.  No new results noted on [facility] or [facility] web site.  Care Everywhere updated.   Patient care coordination note  Next PCP visit Not scheduled  LOV with PCP 05/05/2025    Spoke to pt about getting a remote BP reading. He is on the [facility] Report. He asked me to call him back Monday and he will provide a reading. Uploaded outside labs from [facility].